# Patient Record
Sex: FEMALE | Race: WHITE | NOT HISPANIC OR LATINO | Employment: OTHER | URBAN - METROPOLITAN AREA
[De-identification: names, ages, dates, MRNs, and addresses within clinical notes are randomized per-mention and may not be internally consistent; named-entity substitution may affect disease eponyms.]

---

## 2023-07-17 ENCOUNTER — APPOINTMENT (OUTPATIENT)
Dept: LAB | Facility: LAB | Age: 73
End: 2023-07-17
Payer: MEDICARE

## 2023-07-20 LAB
COTININE BLOOD QUANTITATIVE: 9 NG/ML
NICOTINE BLOOD QUANTITATIVE: <5 NG/ML

## 2023-09-13 PROBLEM — J93.9 PNEUMOTHORAX ON LEFT: Status: ACTIVE | Noted: 2023-09-13

## 2023-09-13 PROBLEM — G62.9 PERIPHERAL NEUROPATHY: Status: ACTIVE | Noted: 2023-09-13

## 2023-09-13 PROBLEM — Z96.89 S/P INSERTION OF SPINAL CORD STIMULATOR: Status: ACTIVE | Noted: 2023-09-13

## 2023-09-13 PROBLEM — G89.4 CHRONIC PAIN DISORDER: Status: ACTIVE | Noted: 2023-09-13

## 2023-09-13 PROBLEM — R60.0 EDEMA OF LOWER EXTREMITY: Status: ACTIVE | Noted: 2023-09-13

## 2023-09-13 PROBLEM — N81.10 FEMALE CYSTOCELE: Status: ACTIVE | Noted: 2023-09-13

## 2023-09-13 PROBLEM — M96.842 POSTPROCEDURAL SEROMA OF A MUSCULOSKELETAL STRUCTURE FOLLOWING A MUSCULOSKELETAL SYSTEM PROCEDURE: Status: ACTIVE | Noted: 2023-09-13

## 2023-09-13 PROBLEM — M48.061 LUMBAR SPINAL STENOSIS: Status: ACTIVE | Noted: 2023-09-13

## 2023-09-13 PROBLEM — G44.86 CERVICOGENIC HEADACHE: Status: ACTIVE | Noted: 2023-09-13

## 2023-09-13 PROBLEM — E11.9 TYPE 2 DIABETES MELLITUS (MULTI): Status: ACTIVE | Noted: 2018-01-26

## 2023-09-13 PROBLEM — N39.0 ACUTE UTI: Status: ACTIVE | Noted: 2023-09-13

## 2023-09-13 PROBLEM — I67.1 INTRACRANIAL ANEURYSM (HHS-HCC): Status: ACTIVE | Noted: 2023-09-13

## 2023-09-13 PROBLEM — T85.192A SPINAL CORD STIMULATOR DYSFUNCTION (CMS-HCC): Status: ACTIVE | Noted: 2023-09-13

## 2023-09-13 PROBLEM — M32.9 LUPUS (MULTI): Status: ACTIVE | Noted: 2023-09-13

## 2023-09-13 PROBLEM — D68.2 FACTOR V DEFICIENCY (MULTI): Status: ACTIVE | Noted: 2023-09-13

## 2023-09-13 PROBLEM — M25.561 BILATERAL KNEE PAIN: Status: ACTIVE | Noted: 2023-09-13

## 2023-09-13 PROBLEM — M25.562 BILATERAL KNEE PAIN: Status: ACTIVE | Noted: 2023-09-13

## 2023-09-13 PROBLEM — Z98.1 S/P LUMBAR FUSION: Status: ACTIVE | Noted: 2023-09-13

## 2023-09-13 PROBLEM — D68.51 FACTOR 5 LEIDEN MUTATION, HETEROZYGOUS (MULTI): Status: ACTIVE | Noted: 2023-07-05

## 2023-09-13 PROBLEM — T45.4X5A ADVERSE EFFECT OF COMPOUND IRON PREPARATION: Status: ACTIVE | Noted: 2023-09-13

## 2023-09-13 PROBLEM — M54.17 LUMBOSACRAL RADICULITIS: Status: ACTIVE | Noted: 2023-09-13

## 2023-09-13 PROBLEM — N39.3 SI (STRESS INCONTINENCE), FEMALE: Status: ACTIVE | Noted: 2023-09-13

## 2023-09-13 PROBLEM — S91.301A WOUND OF RIGHT FOOT: Status: ACTIVE | Noted: 2023-09-13

## 2023-09-13 PROBLEM — T81.89XA INCISIONAL IRRITATION: Status: ACTIVE | Noted: 2023-09-13

## 2023-09-13 PROBLEM — D50.9 IDA (IRON DEFICIENCY ANEMIA): Status: ACTIVE | Noted: 2023-07-05

## 2023-09-13 PROBLEM — N81.2 UTEROVAGINAL PROLAPSE, INCOMPLETE: Status: ACTIVE | Noted: 2023-09-13

## 2023-09-13 PROBLEM — J18.9 PNEUMONIA: Status: ACTIVE | Noted: 2023-09-13

## 2023-09-13 PROBLEM — H26.9 CATARACT: Status: ACTIVE | Noted: 2023-09-13

## 2023-09-13 PROBLEM — E66.01 MORBID OBESITY DUE TO EXCESS CALORIES (MULTI): Status: ACTIVE | Noted: 2023-09-13

## 2023-09-13 PROBLEM — I10 HTN (HYPERTENSION): Status: ACTIVE | Noted: 2023-09-13

## 2023-09-13 PROBLEM — M19.90 ARTHRITIS: Status: ACTIVE | Noted: 2023-09-13

## 2023-09-13 PROBLEM — F17.200 TOBACCO DEPENDENCY: Status: ACTIVE | Noted: 2023-09-13

## 2023-09-13 PROBLEM — E78.5 ELEVATED LIPIDS: Status: ACTIVE | Noted: 2023-09-13

## 2023-09-13 PROBLEM — G47.30 SLEEP APNEA: Status: ACTIVE | Noted: 2023-09-13

## 2023-09-13 PROBLEM — G57.00 PYRIFORMIS SYNDROME: Status: ACTIVE | Noted: 2023-09-13

## 2023-09-13 PROBLEM — K58.9 IBS (IRRITABLE BOWEL SYNDROME): Status: ACTIVE | Noted: 2023-09-13

## 2023-09-13 PROBLEM — K21.00 REFLUX ESOPHAGITIS: Status: ACTIVE | Noted: 2018-03-28

## 2023-09-13 PROBLEM — E11.9 DIABETES (MULTI): Status: ACTIVE | Noted: 2023-09-13

## 2023-09-13 PROBLEM — D64.9 ANEMIA: Status: ACTIVE | Noted: 2023-09-13

## 2023-09-13 PROBLEM — K44.9 HIATAL HERNIA: Status: ACTIVE | Noted: 2018-03-28

## 2023-09-13 PROBLEM — K25.9 GASTRIC ULCER: Status: ACTIVE | Noted: 2018-03-28

## 2023-09-13 PROBLEM — N81.6 HERNIATION OF RECTUM INTO VAGINA: Status: ACTIVE | Noted: 2023-09-13

## 2023-09-13 RX ORDER — GABAPENTIN 600 MG/1
TABLET ORAL
COMMUNITY
Start: 2021-10-08

## 2023-09-13 RX ORDER — HYDROCORTISONE 25 MG/G
CREAM TOPICAL
COMMUNITY
Start: 2023-02-21 | End: 2023-10-30 | Stop reason: ALTCHOICE

## 2023-09-13 RX ORDER — OXYCODONE AND ACETAMINOPHEN 5; 325 MG/1; MG/1
TABLET ORAL
COMMUNITY
Start: 2010-04-14 | End: 2023-10-30 | Stop reason: ALTCHOICE

## 2023-09-13 RX ORDER — GUAIFENESIN 1200 MG
650 TABLET, EXTENDED RELEASE 12 HR ORAL
COMMUNITY
Start: 2022-04-29

## 2023-09-13 RX ORDER — CHOLECALCIFEROL (VITAMIN D3) 25 MCG
TABLET ORAL
COMMUNITY
End: 2023-10-30 | Stop reason: ALTCHOICE

## 2023-09-13 RX ORDER — ASPIRIN 325 MG
325 TABLET ORAL 2 TIMES DAILY
COMMUNITY
Start: 2010-04-14 | End: 2023-10-30 | Stop reason: ALTCHOICE

## 2023-09-13 RX ORDER — BENZONATATE 100 MG/1
200 CAPSULE ORAL 3 TIMES DAILY
COMMUNITY
Start: 2022-10-16 | End: 2023-10-30 | Stop reason: ALTCHOICE

## 2023-09-13 RX ORDER — OMEPRAZOLE 40 MG/1
CAPSULE, DELAYED RELEASE ORAL
COMMUNITY
Start: 2021-04-19

## 2023-09-13 RX ORDER — TRIAMTERENE/HYDROCHLOROTHIAZID 37.5-25 MG
TABLET ORAL
COMMUNITY
Start: 2011-08-31

## 2023-09-13 RX ORDER — TORSEMIDE 10 MG/1
TABLET ORAL
COMMUNITY
Start: 2021-07-13 | End: 2024-01-08

## 2023-09-13 RX ORDER — ALBUTEROL SULFATE 90 UG/1
2 AEROSOL, METERED RESPIRATORY (INHALATION) EVERY 6 HOURS PRN
COMMUNITY
Start: 2022-11-11

## 2023-09-13 RX ORDER — PREDNISONE 10 MG/1
TABLET ORAL
COMMUNITY
Start: 2022-10-16 | End: 2023-10-30 | Stop reason: ALTCHOICE

## 2023-09-13 RX ORDER — OXYCODONE HYDROCHLORIDE 5 MG/1
TABLET ORAL
COMMUNITY
Start: 2022-01-16

## 2023-09-13 RX ORDER — COVID-19 MOLECULAR TEST ASSAY
KIT MISCELLANEOUS
COMMUNITY
Start: 2023-03-05 | End: 2023-10-30 | Stop reason: ALTCHOICE

## 2023-09-13 RX ORDER — METFORMIN HYDROCHLORIDE 500 MG/1
TABLET ORAL
COMMUNITY
Start: 2010-04-14 | End: 2023-10-30 | Stop reason: ALTCHOICE

## 2023-09-13 RX ORDER — CLOTRIMAZOLE 1 %
CREAM (GRAM) TOPICAL
COMMUNITY
Start: 2023-06-01 | End: 2023-10-30 | Stop reason: ALTCHOICE

## 2023-09-13 RX ORDER — ERGOCALCIFEROL (VITAMIN D2) 50 MCG
CAPSULE ORAL
COMMUNITY
End: 2023-10-30 | Stop reason: SDUPTHER

## 2023-09-13 RX ORDER — HYDROXYCHLOROQUINE SULFATE 200 MG/1
TABLET, FILM COATED ORAL
COMMUNITY
End: 2023-10-30 | Stop reason: ALTCHOICE

## 2023-09-13 RX ORDER — FUROSEMIDE 20 MG/1
20 TABLET ORAL
COMMUNITY
Start: 2022-10-14

## 2023-09-13 RX ORDER — DULOXETIN HYDROCHLORIDE 30 MG/1
30 CAPSULE, DELAYED RELEASE ORAL DAILY
COMMUNITY
End: 2023-11-24

## 2023-09-13 RX ORDER — LEVOTHYROXINE SODIUM 125 UG/1
TABLET ORAL
COMMUNITY
Start: 2010-04-14

## 2023-09-13 RX ORDER — MUPIROCIN 20 MG/G
OINTMENT TOPICAL
COMMUNITY
Start: 2023-03-24 | End: 2023-10-30 | Stop reason: ALTCHOICE

## 2023-09-13 RX ORDER — GLIMEPIRIDE 2 MG/1
2 TABLET ORAL
COMMUNITY
Start: 2023-06-01 | End: 2024-01-08

## 2023-09-13 RX ORDER — MELOXICAM 15 MG/1
15 TABLET ORAL
COMMUNITY
Start: 2010-04-14 | End: 2023-10-30 | Stop reason: ALTCHOICE

## 2023-09-13 RX ORDER — AMLODIPINE BESYLATE 5 MG/1
5 TABLET ORAL DAILY
COMMUNITY

## 2023-09-13 RX ORDER — METFORMIN HYDROCHLORIDE 1000 MG/1
TABLET ORAL
COMMUNITY
Start: 2021-10-08

## 2023-09-13 RX ORDER — OXYCODONE AND ACETAMINOPHEN 10; 325 MG/1; MG/1
TABLET ORAL
COMMUNITY

## 2023-09-13 RX ORDER — PNV NO.95/FERROUS FUM/FOLIC AC 28MG-0.8MG
TABLET ORAL
COMMUNITY
End: 2023-10-30 | Stop reason: ALTCHOICE

## 2023-09-13 RX ORDER — TOPIRAMATE 25 MG/1
TABLET ORAL
COMMUNITY
End: 2023-10-30 | Stop reason: SINTOL

## 2023-09-13 RX ORDER — NIRMATRELVIR AND RITONAVIR 300-100 MG
KIT ORAL
COMMUNITY
Start: 2022-10-04 | End: 2023-10-30 | Stop reason: ALTCHOICE

## 2023-10-04 DIAGNOSIS — Z98.1 S/P LUMBAR FUSION: ICD-10-CM

## 2023-10-16 ENCOUNTER — OFFICE VISIT (OUTPATIENT)
Dept: NEUROSURGERY | Facility: CLINIC | Age: 73
End: 2023-10-16
Payer: MEDICARE

## 2023-10-16 VITALS
BODY MASS INDEX: 42.48 KG/M2 | HEART RATE: 93 BPM | TEMPERATURE: 97.3 F | SYSTOLIC BLOOD PRESSURE: 168 MMHG | HEIGHT: 61 IN | DIASTOLIC BLOOD PRESSURE: 76 MMHG | WEIGHT: 225 LBS

## 2023-10-16 DIAGNOSIS — G89.29 CHRONIC LOW BACK PAIN WITH LEFT-SIDED SCIATICA, UNSPECIFIED BACK PAIN LATERALITY: Primary | ICD-10-CM

## 2023-10-16 DIAGNOSIS — M54.42 CHRONIC LOW BACK PAIN WITH LEFT-SIDED SCIATICA, UNSPECIFIED BACK PAIN LATERALITY: Primary | ICD-10-CM

## 2023-10-16 PROCEDURE — 3077F SYST BP >= 140 MM HG: CPT | Performed by: STUDENT IN AN ORGANIZED HEALTH CARE EDUCATION/TRAINING PROGRAM

## 2023-10-16 PROCEDURE — 3078F DIAST BP <80 MM HG: CPT | Performed by: STUDENT IN AN ORGANIZED HEALTH CARE EDUCATION/TRAINING PROGRAM

## 2023-10-16 PROCEDURE — 1125F AMNT PAIN NOTED PAIN PRSNT: CPT | Performed by: STUDENT IN AN ORGANIZED HEALTH CARE EDUCATION/TRAINING PROGRAM

## 2023-10-16 PROCEDURE — 99024 POSTOP FOLLOW-UP VISIT: CPT | Performed by: STUDENT IN AN ORGANIZED HEALTH CARE EDUCATION/TRAINING PROGRAM

## 2023-10-16 PROCEDURE — 1159F MED LIST DOCD IN RCRD: CPT | Performed by: STUDENT IN AN ORGANIZED HEALTH CARE EDUCATION/TRAINING PROGRAM

## 2023-10-16 PROCEDURE — 1160F RVW MEDS BY RX/DR IN RCRD: CPT | Performed by: STUDENT IN AN ORGANIZED HEALTH CARE EDUCATION/TRAINING PROGRAM

## 2023-10-16 ASSESSMENT — PAIN SCALES - GENERAL: PAINLEVEL: 7

## 2023-10-16 ASSESSMENT — PATIENT HEALTH QUESTIONNAIRE - PHQ9
2. FEELING DOWN, DEPRESSED OR HOPELESS: NOT AT ALL
SUM OF ALL RESPONSES TO PHQ9 QUESTIONS 1 AND 2: 0
1. LITTLE INTEREST OR PLEASURE IN DOING THINGS: NOT AT ALL

## 2023-10-16 ASSESSMENT — ENCOUNTER SYMPTOMS: DEPRESSION: 0

## 2023-10-16 NOTE — PROGRESS NOTES
[facility] [place]  Department of Neurological Surgery  Post Operative Patient Visit      History of Present Illness:  Kate Howard is a 73 y.o. year old female who presents to the spine clinic in a post operative visit. Since surgery they are doing okay. Today, she is complaining of constant mid and lower back pain radiating to the right. The pain is aching and worsens with applied pressure. She reports that her pain medication requirements are better now than before surgery. She has been doing physical therapy which she enjoys.    Her X-ray from 10/12/2023 looks good, her hardware is in good position. Continue with physical and aquatic therapy. Follow up in 4 months for routine, call sooner if needed.    The above clinical summary has been dictated with voice recognition software. It has not been proofread for grammatical errors, typographical mistakes, or other semantic inconsistencies.    Thank you for visiting our office today. It was our pleasure to take part in your healthcare.     Do not hesitate to call with any questions regarding your plan of care after leaving at [phone] M-F 8am-4pm.     To clinicians, thank you very much for this kind referral. It is a privilege to partner with you in the care of your patients. My office would be delighted to assist you with any further consultations or with questions regarding the plan of care outlined. Do not hesitate to call the office or contact me directly.       Sincerely,      Doug Whitehead MD  Director, [facility]   of Neurosurgery, [facility] and [facility]  Complex Spine Fellowship Director  , Department of Neurological Surgery  [facility]    [address]  [facility]  [address]  [place] [address]    Phone: [phone]  Fax: [phone]        Scribe Attestation  By signing my name below, I, Siri Corral , Scribe   attest that this documentation has been prepared under the direction and in the presence of Doug Whitehead MD.

## 2023-10-19 ENCOUNTER — TELEPHONE (OUTPATIENT)
Dept: NEUROSURGERY | Facility: CLINIC | Age: 73
End: 2023-10-19
Payer: MEDICARE

## 2023-10-30 ENCOUNTER — OFFICE VISIT (OUTPATIENT)
Dept: NEUROSURGERY | Facility: CLINIC | Age: 73
End: 2023-10-30
Payer: MEDICARE

## 2023-10-30 VITALS
BODY MASS INDEX: 41.54 KG/M2 | SYSTOLIC BLOOD PRESSURE: 132 MMHG | WEIGHT: 220 LBS | DIASTOLIC BLOOD PRESSURE: 84 MMHG | HEIGHT: 61 IN | TEMPERATURE: 96.9 F | HEART RATE: 89 BPM

## 2023-10-30 DIAGNOSIS — M48.062 SPINAL STENOSIS OF LUMBAR REGION WITH NEUROGENIC CLAUDICATION: Primary | ICD-10-CM

## 2023-10-30 PROCEDURE — 1125F AMNT PAIN NOTED PAIN PRSNT: CPT | Performed by: STUDENT IN AN ORGANIZED HEALTH CARE EDUCATION/TRAINING PROGRAM

## 2023-10-30 PROCEDURE — 3075F SYST BP GE 130 - 139MM HG: CPT | Performed by: STUDENT IN AN ORGANIZED HEALTH CARE EDUCATION/TRAINING PROGRAM

## 2023-10-30 PROCEDURE — 1160F RVW MEDS BY RX/DR IN RCRD: CPT | Performed by: STUDENT IN AN ORGANIZED HEALTH CARE EDUCATION/TRAINING PROGRAM

## 2023-10-30 PROCEDURE — 3079F DIAST BP 80-89 MM HG: CPT | Performed by: STUDENT IN AN ORGANIZED HEALTH CARE EDUCATION/TRAINING PROGRAM

## 2023-10-30 PROCEDURE — 99024 POSTOP FOLLOW-UP VISIT: CPT | Performed by: STUDENT IN AN ORGANIZED HEALTH CARE EDUCATION/TRAINING PROGRAM

## 2023-10-30 PROCEDURE — 1159F MED LIST DOCD IN RCRD: CPT | Performed by: STUDENT IN AN ORGANIZED HEALTH CARE EDUCATION/TRAINING PROGRAM

## 2023-10-30 ASSESSMENT — PATIENT HEALTH QUESTIONNAIRE - PHQ9
SUM OF ALL RESPONSES TO PHQ9 QUESTIONS 1 & 2: 0
1. LITTLE INTEREST OR PLEASURE IN DOING THINGS: NOT AT ALL
2. FEELING DOWN, DEPRESSED OR HOPELESS: NOT AT ALL

## 2023-10-30 ASSESSMENT — PAIN SCALES - GENERAL: PAINLEVEL: 7

## 2023-10-30 ASSESSMENT — LIFESTYLE VARIABLES
AUDIT-C TOTAL SCORE: 0
HOW OFTEN DO YOU HAVE A DRINK CONTAINING ALCOHOL: NEVER
HOW MANY STANDARD DRINKS CONTAINING ALCOHOL DO YOU HAVE ON A TYPICAL DAY: PATIENT DOES NOT DRINK
SKIP TO QUESTIONS 9-10: 1
HOW OFTEN DO YOU HAVE SIX OR MORE DRINKS ON ONE OCCASION: NEVER

## 2023-10-30 NOTE — PROGRESS NOTES
[facility] [place]  Department of Neurological Surgery  Post Operative Patient Visit      History of Present Illness:  Kate Howard is a 73 y.o. year old female who presents to the spine clinic in a post operative visit. Since surgery they are 3mo post op s/p L1-pelvis extension revision of hardware. She is doing well. She had a seroma at the site of surgery so she's here in office today for drainage. Verbal consent was obtained and needle aspiration of the seroma was performed at the bedside. She tolerated the procedure well. 100 cc of fluid was aspirated.    The above clinical summary has been dictated with voice recognition software. It has not been proofread for grammatical errors, typographical mistakes, or other semantic inconsistencies.    Thank you for visiting our office today. It was our pleasure to take part in your healthcare.     Do not hesitate to call with any questions regarding your plan of care after leaving at [phone] M-F 8am-4pm.     To clinicians, thank you very much for this kind referral. It is a privilege to partner with you in the care of your patients. My office would be delighted to assist you with any further consultations or with questions regarding the plan of care outlined. Do not hesitate to call the office or contact me directly.       Sincerely,      Doug Whitehead MD  Director, [facility] [place]   of Neurosurgery, [facility] and [facility]  Complex Spine Fellowship Director  , Department of Neurological Surgery  [facility]    [place] [address] [address] [place] [address] [id number] [place] [address]  [address] [place] [address]  [address] [place] [address]    Phone: [phone]  Fax: [phone]        Scribe Attestation  By signing my name below, YEFRI Siri  Patrice Corral   attest that this documentation has been prepared under the direction and in the presence of Doug Whitehead MD.

## 2023-11-03 ENCOUNTER — TELEPHONE (OUTPATIENT)
Dept: NEUROSURGERY | Facility: CLINIC | Age: 73
End: 2023-11-03
Payer: MEDICARE

## 2023-11-13 ENCOUNTER — TELEPHONE (OUTPATIENT)
Dept: NEUROSURGERY | Facility: CLINIC | Age: 73
End: 2023-11-13
Payer: MEDICARE

## 2023-11-13 DIAGNOSIS — Z98.1 S/P LUMBAR FUSION: Primary | ICD-10-CM

## 2023-11-14 DIAGNOSIS — M48.062 NEUROGENIC CLAUDICATION DUE TO LUMBAR SPINAL STENOSIS: Primary | ICD-10-CM

## 2023-11-14 DIAGNOSIS — R79.1 ABNORMAL COAGULATION PROFILE: ICD-10-CM

## 2023-11-14 NOTE — PROGRESS NOTES
Increased fluid reaccumulation after drainage in office 1-2 weeks ago, concern for seroma reformation. Will send for serum blood counts to rule out concern for infection and MRI for re-eval.       Doug Whitehead M.D.  Director of Spine [place]  [facility]   of Neurological Surgery  [facility]  Office: [phone]  Fax: [phone]

## 2023-11-21 DIAGNOSIS — M96.1 POSTLAMINECTOMY SYNDROME, NOT ELSEWHERE CLASSIFIED: ICD-10-CM

## 2023-11-24 RX ORDER — DULOXETIN HYDROCHLORIDE 30 MG/1
30 CAPSULE, DELAYED RELEASE ORAL DAILY
Qty: 90 CAPSULE | Refills: 2 | Status: SHIPPED | OUTPATIENT
Start: 2023-11-24 | End: 2024-01-08

## 2023-12-08 ENCOUNTER — TELEPHONE (OUTPATIENT)
Dept: NEUROSURGERY | Facility: CLINIC | Age: 73
End: 2023-12-08
Payer: MEDICARE

## 2023-12-12 DIAGNOSIS — M48.062 SPINAL STENOSIS OF LUMBAR REGION WITH NEUROGENIC CLAUDICATION: Primary | ICD-10-CM

## 2024-01-03 ENCOUNTER — TELEPHONE (OUTPATIENT)
Dept: NEUROSURGERY | Facility: CLINIC | Age: 74
End: 2024-01-03
Payer: MEDICARE

## 2024-01-04 NOTE — TELEPHONE ENCOUNTER
How much is it putting out? She needs a follow up visit with Lydia or Kp to evaluate the drainage daily. It should be taken out in the office by us.

## 2024-01-09 ENCOUNTER — HOSPITAL ENCOUNTER (OUTPATIENT)
Dept: RADIOLOGY | Facility: HOSPITAL | Age: 74
Discharge: HOME | End: 2024-01-09
Payer: MEDICARE

## 2024-01-09 VITALS
SYSTOLIC BLOOD PRESSURE: 138 MMHG | RESPIRATION RATE: 14 BRPM | HEART RATE: 79 BPM | DIASTOLIC BLOOD PRESSURE: 77 MMHG | TEMPERATURE: 98.2 F | OXYGEN SATURATION: 96 %

## 2024-01-09 DIAGNOSIS — M48.062 SPINAL STENOSIS OF LUMBAR REGION WITH NEUROGENIC CLAUDICATION: ICD-10-CM

## 2024-01-09 LAB
INR PPP: 1 (ref 0.9–1.1)
PLATELET # BLD AUTO: 208 X10*3/UL (ref 150–450)
PROTHROMBIN TIME: 11.5 SECONDS (ref 9.8–12.8)

## 2024-01-09 PROCEDURE — 76942 ECHO GUIDE FOR BIOPSY: CPT | Performed by: RADIOLOGY

## 2024-01-09 PROCEDURE — 36415 COLL VENOUS BLD VENIPUNCTURE: CPT | Performed by: RADIOLOGY

## 2024-01-09 PROCEDURE — C1729 CATH, DRAINAGE: HCPCS

## 2024-01-09 PROCEDURE — 99152 MOD SED SAME PHYS/QHP 5/>YRS: CPT | Performed by: RADIOLOGY

## 2024-01-09 PROCEDURE — 85049 AUTOMATED PLATELET COUNT: CPT | Performed by: RADIOLOGY

## 2024-01-09 PROCEDURE — 49406 IMAGE CATH FLUID PERI/RETRO: CPT | Performed by: RADIOLOGY

## 2024-01-09 PROCEDURE — 10030 IMG GID FLU COLL DRG SFT TIS: CPT

## 2024-01-09 PROCEDURE — 76942 ECHO GUIDE FOR BIOPSY: CPT

## 2024-01-09 PROCEDURE — 85610 PROTHROMBIN TIME: CPT | Performed by: RADIOLOGY

## 2024-01-09 PROCEDURE — 2720000007 HC OR 272 NO HCPCS

## 2024-01-09 PROCEDURE — 2500000004 HC RX 250 GENERAL PHARMACY W/ HCPCS (ALT 636 FOR OP/ED): Performed by: RADIOLOGY

## 2024-01-09 PROCEDURE — 2500000005 HC RX 250 GENERAL PHARMACY W/O HCPCS: Performed by: RADIOLOGY

## 2024-01-09 PROCEDURE — C1769 GUIDE WIRE: HCPCS

## 2024-01-09 PROCEDURE — 99152 MOD SED SAME PHYS/QHP 5/>YRS: CPT

## 2024-01-09 RX ORDER — MIDAZOLAM HYDROCHLORIDE 1 MG/ML
INJECTION INTRAMUSCULAR; INTRAVENOUS
Status: COMPLETED | OUTPATIENT
Start: 2024-01-09 | End: 2024-01-09

## 2024-01-09 RX ORDER — SODIUM CHLORIDE 9 MG/ML
INJECTION, SOLUTION INTRAVENOUS CONTINUOUS PRN
Status: COMPLETED | OUTPATIENT
Start: 2024-01-09 | End: 2024-01-09

## 2024-01-09 RX ORDER — FENTANYL CITRATE 50 UG/ML
INJECTION, SOLUTION INTRAMUSCULAR; INTRAVENOUS
Status: COMPLETED | OUTPATIENT
Start: 2024-01-09 | End: 2024-01-09

## 2024-01-09 RX ADMIN — FENTANYL CITRATE 50 MCG: 50 INJECTION, SOLUTION INTRAMUSCULAR; INTRAVENOUS at 10:50

## 2024-01-09 RX ADMIN — SODIUM CHLORIDE 50 ML/HR: 9 INJECTION, SOLUTION INTRAVENOUS at 10:50

## 2024-01-09 RX ADMIN — Medication 3 L/MIN: at 10:50

## 2024-01-09 RX ADMIN — MIDAZOLAM HYDROCHLORIDE 1 MG: 1 INJECTION, SOLUTION INTRAMUSCULAR; INTRAVENOUS at 10:50

## 2024-01-09 ASSESSMENT — PAIN - FUNCTIONAL ASSESSMENT
PAIN_FUNCTIONAL_ASSESSMENT: 0-10

## 2024-01-09 ASSESSMENT — PAIN SCALES - GENERAL
PAINLEVEL_OUTOF10: 0 - NO PAIN
PAINLEVEL_OUTOF10: 5 - MODERATE PAIN
PAINLEVEL_OUTOF10: 0 - NO PAIN
PAINLEVEL_OUTOF10: 5 - MODERATE PAIN
PAINLEVEL_OUTOF10: 5 - MODERATE PAIN
PAINLEVEL_OUTOF10: 0 - NO PAIN

## 2024-01-09 ASSESSMENT — PAIN DESCRIPTION - DESCRIPTORS
DESCRIPTORS: ACHING

## 2024-01-09 NOTE — Clinical Note
StayFix dressing and tegaderm placed by Dr. Granados. [place] drain bag attached to drain. Procedure end.

## 2024-01-09 NOTE — PROCEDURES
Interventional Radiology Brief Postprocedure Note    Attending: Anneliese Granados MD    Assistant:   Staff Role   Michelle Carmen MT Radiology Technologist   Caroline Cadena, RN Radiology Nurse   Anneliese Granados MD Radiologist       Diagnosis:   1. Spinal stenosis of lumbar region with neurogenic claudication  IR body drain placement    IR body drain placement          Description of procedure: IR body drain placement 10 Fr drain lower back seroma    Timeout:  Yes    Procedure Area: Procedure Area     Anesthesia:   Conscious Sedation    Complications: None    Estimated Blood Loss: none    Medications (Filter: Administrations occurring from 1037 to 1107 on 01/09/24) As of 01/09/24 1107      fentaNYL PF (Sublimaze) injection (mcg) Total dose:  50 mcg      Date/Time Rate/Dose/Volume Action       01/09/24  1050 50 mcg Given               midazolam (Versed) injection (mg) Total dose:  1 mg      Date/Time Rate/Dose/Volume Action       01/09/24  1050 1 mg Given               sodium chloride 0.9% infusion (mL/hr) Total volume:  Not documented*   *Total volume has not been documented. View each administration to see the amount administered.     Date/Time Rate/Dose/Volume Action       01/09/24  1050 50 mL/hr New Bag               oxygen (O2) therapy (L/min) Total volume:  Not documented*   *Total volume has not been documented. View each administration to see the amount administered.     Date/Time Rate/Dose/Volume Action       01/09/24  1050 3 L/min Start                   No specimens collected      See detailed result report with images in PACS.    The patient tolerated the procedure well without incident or complication and is in stable condition.

## 2024-01-09 NOTE — PRE-PROCEDURE NOTE
Interventional Radiology Preprocedure Note    Indication for procedure: The encounter diagnosis was Spinal stenosis of lumbar region with neurogenic claudication.    Relevant review of systems: NA    Relevant Labs:   Lab Results   Component Value Date    CREATININE CANCELED 08/05/2023    INR 1.0 01/09/2024    PROTIME 11.5 01/09/2024       Planned Sedation/Anesthesia: Moderate    Airway assessment: normal    Directed physical examination:    Aox3  No increased work of breathing.   No acute distress      Mallampati: II (hard and soft palate, upper portion of tonsils anduvula visible)    ASA Score: ASA 2 - Patient with mild systemic disease with no functional limitations    Benefits, risks and alternatives of procedure and planned sedation have been discussed with the patient and/or their representative. All questions answered and they agree to proceed.

## 2024-01-09 NOTE — DISCHARGE INSTRUCTIONS
Patient educated regarding care after drain placement for seroma as well as given written instruction, including do not get dressing wet, flush once daily (5 ml toward patient, 5 ml toward drainage bag), and how to change dressing. Patient also given 3 weeks worth of supplies for dressings and flushes.

## 2024-01-18 ENCOUNTER — OFFICE VISIT (OUTPATIENT)
Dept: NEUROSURGERY | Facility: CLINIC | Age: 74
End: 2024-01-18
Payer: MEDICARE

## 2024-01-18 DIAGNOSIS — M96.842 POSTOPERATIVE SEROMA OF MUSCULOSKELETAL STRUCTURE AFTER MUSCULOSKELETAL PROCEDURE: Primary | ICD-10-CM

## 2024-01-18 DIAGNOSIS — Z98.1 S/P LUMBAR FUSION: ICD-10-CM

## 2024-01-18 PROCEDURE — 3008F BODY MASS INDEX DOCD: CPT | Performed by: NURSE PRACTITIONER

## 2024-01-18 PROCEDURE — 1125F AMNT PAIN NOTED PAIN PRSNT: CPT | Performed by: NURSE PRACTITIONER

## 2024-01-18 PROCEDURE — 1160F RVW MEDS BY RX/DR IN RCRD: CPT | Performed by: NURSE PRACTITIONER

## 2024-01-18 PROCEDURE — 99213 OFFICE O/P EST LOW 20 MIN: CPT | Performed by: NURSE PRACTITIONER

## 2024-01-18 PROCEDURE — 1159F MED LIST DOCD IN RCRD: CPT | Performed by: NURSE PRACTITIONER

## 2024-01-18 ASSESSMENT — PATIENT HEALTH QUESTIONNAIRE - PHQ9
6. FEELING BAD ABOUT YOURSELF - OR THAT YOU ARE A FAILURE OR HAVE LET YOURSELF OR YOUR FAMILY DOWN: NOT AT ALL
4. FEELING TIRED OR HAVING LITTLE ENERGY: NEARLY EVERY DAY
1. LITTLE INTEREST OR PLEASURE IN DOING THINGS: MORE THAN HALF THE DAYS
5. POOR APPETITE OR OVEREATING: NOT AT ALL
2. FEELING DOWN, DEPRESSED OR HOPELESS: SEVERAL DAYS
8. MOVING OR SPEAKING SO SLOWLY THAT OTHER PEOPLE COULD HAVE NOTICED. OR THE OPPOSITE, BEING SO FIGETY OR RESTLESS THAT YOU HAVE BEEN MOVING AROUND A LOT MORE THAN USUAL: NOT AT ALL
SUM OF ALL RESPONSES TO PHQ9 QUESTIONS 1 & 2: 3
7. TROUBLE CONCENTRATING ON THINGS, SUCH AS READING THE NEWSPAPER OR WATCHING TELEVISION: NOT AT ALL

## 2024-01-18 ASSESSMENT — ENCOUNTER SYMPTOMS: OCCASIONAL FEELINGS OF UNSTEADINESS: 1

## 2024-01-18 NOTE — PROGRESS NOTES
"Kate Howard is here today in follow up for post operative seroma and to have drain removed. To review, she was last evaluated on Dr. Doug Whitehead on 10/30/2023. She then underwent placement of drain in lumbar seroma by Dr. Reji Granados at [facility] on 01/09/2024. 100 mL of serous fluid was drained at placement.    She underwent L1 - pelvis extension / revision of hardware on 08/02/2023, by Dr. Doug Whitehead at [facility].     Today, she reports that she has had 100 mL emptied daily for the first week, and 75 mL daily over past 2 days. She denies positional headaches (\"just my normal headaches\" present prior to surgery). She is able to complete ADLs with some assistance from others.     TREATMENTS:  Drain    SMOKER: YES  ANTICOAGULANT USE: No    ROS x 10 is, otherwise, negative unless documented in HPI above    On Exam: Appears comfortable  A&O x 4, speech clear / fluent  Respirations even / unlabored  Abdomen without distension  WALSH  Drain site at left lower lumbar region is clean /dry with suture intact. Drainage is clear, light lennox. Approximately 75 mL in drain at this time. Dressing changed at this visit, using her supplies.  Gait is steady with use of wheeled walker    Discussed with Dr. Whitehead: will maintain drain for another week, then remove at next visit (01/25/2024). We discussed plan for return in one week. She is in agreement with plan and agrees to continue documenting drain output.         "

## 2024-01-23 ENCOUNTER — TELEPHONE (OUTPATIENT)
Dept: NEUROSURGERY | Facility: CLINIC | Age: 74
End: 2024-01-23
Payer: MEDICARE

## 2024-01-24 NOTE — PROGRESS NOTES
Kate Howard is here, with her , today in follow up for post operative seroma and to have drain removed. To review, she was last evaluated on Dr. Doug Whitehead on 10/30/2023. She then underwent placement of drain in lumbar seroma by Dr. Reji Granados at [facility] on 01/09/2024. 100 mL of serous fluid was drained at placement.     She underwent L1 - pelvis extension / revision of hardware on 08/02/2023, by Dr. Doug Whitehead at [facility].     Today, she reports continuation of 80 - 90 mL drainage, serous colored, daily (amount includes 2 flushes). She continues to deny new headaches.     SMOKER: YES  ANTICOAGULANT USE: No     ROS x 10 is, otherwise, negative unless documented in HPI above     On Exam: Appears comfortable  A&O x 4, speech clear / fluent  Respirations even / unlabored  Abdomen without distension  WALSH - ambulates with wheeled walker  INCISION: well - healed.   DRAIN: serous / yellow clear drainage in drain collection bag. No swelling at drain / seroma site    Kate Howard is progressing well post drain placement. Discussed out put with Dr. Whitehead: will ask Dr. Reji Granados to consider sclerosis of seroma (secure chat sent). Discussed with patient: she will await phone call from our or Dr. Granados's office to discuss.    Otherwise, she agrees to follow up with Dr. Whitehead as previously scheduled, 02/05/2024,, with lumbar spine x-rays prior to visit.

## 2024-01-25 ENCOUNTER — OFFICE VISIT (OUTPATIENT)
Dept: NEUROSURGERY | Facility: CLINIC | Age: 74
End: 2024-01-25
Payer: MEDICARE

## 2024-01-25 ENCOUNTER — PREP FOR PROCEDURE (OUTPATIENT)
Dept: RADIOLOGY | Facility: HOSPITAL | Age: 74
End: 2024-01-25

## 2024-01-25 VITALS — TEMPERATURE: 96.6 F

## 2024-01-25 DIAGNOSIS — Z98.1 S/P LUMBAR FUSION: ICD-10-CM

## 2024-01-25 DIAGNOSIS — L76.34 POSTOPERATIVE SEROMA OF SUBCUTANEOUS TISSUE AFTER NON-DERMATOLOGIC PROCEDURE: Primary | ICD-10-CM

## 2024-01-25 DIAGNOSIS — M96.842 POSTOPERATIVE SEROMA OF MUSCULOSKELETAL STRUCTURE AFTER MUSCULOSKELETAL PROCEDURE: Primary | ICD-10-CM

## 2024-01-25 PROCEDURE — 1125F AMNT PAIN NOTED PAIN PRSNT: CPT | Performed by: NURSE PRACTITIONER

## 2024-01-25 PROCEDURE — 1160F RVW MEDS BY RX/DR IN RCRD: CPT | Performed by: NURSE PRACTITIONER

## 2024-01-25 PROCEDURE — 1157F ADVNC CARE PLAN IN RCRD: CPT | Performed by: NURSE PRACTITIONER

## 2024-01-25 PROCEDURE — 99213 OFFICE O/P EST LOW 20 MIN: CPT | Performed by: NURSE PRACTITIONER

## 2024-01-25 PROCEDURE — 3008F BODY MASS INDEX DOCD: CPT | Performed by: NURSE PRACTITIONER

## 2024-01-25 PROCEDURE — 1159F MED LIST DOCD IN RCRD: CPT | Performed by: NURSE PRACTITIONER

## 2024-01-25 ASSESSMENT — PAIN SCALES - GENERAL: PAINLEVEL: 7

## 2024-01-30 ENCOUNTER — TELEPHONE (OUTPATIENT)
Dept: NEUROSURGERY | Facility: CLINIC | Age: 74
End: 2024-01-30
Payer: MEDICARE

## 2024-02-02 ENCOUNTER — HOSPITAL ENCOUNTER (OUTPATIENT)
Dept: RADIOLOGY | Facility: CLINIC | Age: 74
Discharge: HOME | End: 2024-02-02
Payer: MEDICARE

## 2024-02-02 ENCOUNTER — HOSPITAL ENCOUNTER (OUTPATIENT)
Dept: RADIOLOGY | Facility: HOSPITAL | Age: 74
Discharge: HOME | End: 2024-02-02
Payer: MEDICARE

## 2024-02-02 VITALS
TEMPERATURE: 98.6 F | WEIGHT: 220.02 LBS | BODY MASS INDEX: 44.36 KG/M2 | DIASTOLIC BLOOD PRESSURE: 76 MMHG | SYSTOLIC BLOOD PRESSURE: 128 MMHG | RESPIRATION RATE: 16 BRPM | HEART RATE: 76 BPM | HEIGHT: 59 IN | OXYGEN SATURATION: 95 %

## 2024-02-02 DIAGNOSIS — M96.842 POSTOPERATIVE SEROMA OF MUSCULOSKELETAL STRUCTURE AFTER MUSCULOSKELETAL PROCEDURE: ICD-10-CM

## 2024-02-02 DIAGNOSIS — L76.34 POSTOPERATIVE SEROMA OF SUBCUTANEOUS TISSUE AFTER NON-DERMATOLOGIC PROCEDURE: ICD-10-CM

## 2024-02-02 DIAGNOSIS — Z98.1 S/P LUMBAR FUSION: ICD-10-CM

## 2024-02-02 PROCEDURE — 2500000004 HC RX 250 GENERAL PHARMACY W/ HCPCS (ALT 636 FOR OP/ED)

## 2024-02-02 PROCEDURE — 49185 SCLEROTX FLUID COLLECTION: CPT

## 2024-02-02 PROCEDURE — 76080 X-RAY EXAM OF FISTULA: CPT

## 2024-02-02 PROCEDURE — 2500000005 HC RX 250 GENERAL PHARMACY W/O HCPCS: Performed by: RADIOLOGY

## 2024-02-02 PROCEDURE — 99152 MOD SED SAME PHYS/QHP 5/>YRS: CPT

## 2024-02-02 PROCEDURE — 99152 MOD SED SAME PHYS/QHP 5/>YRS: CPT | Performed by: RADIOLOGY

## 2024-02-02 PROCEDURE — 2500000004 HC RX 250 GENERAL PHARMACY W/ HCPCS (ALT 636 FOR OP/ED): Performed by: RADIOLOGY

## 2024-02-02 PROCEDURE — 72100 X-RAY EXAM L-S SPINE 2/3 VWS: CPT | Performed by: RADIOLOGY

## 2024-02-02 PROCEDURE — 49185 SCLEROTX FLUID COLLECTION: CPT | Performed by: RADIOLOGY

## 2024-02-02 PROCEDURE — 49424 ASSESS CYST CONTRAST INJECT: CPT

## 2024-02-02 PROCEDURE — 72100 X-RAY EXAM L-S SPINE 2/3 VWS: CPT

## 2024-02-02 RX ORDER — SODIUM CHLORIDE 9 MG/ML
INJECTION, SOLUTION INTRAVENOUS CONTINUOUS PRN
Status: COMPLETED | OUTPATIENT
Start: 2024-02-02 | End: 2024-02-02

## 2024-02-02 RX ORDER — MIDAZOLAM HYDROCHLORIDE 1 MG/ML
INJECTION INTRAMUSCULAR; INTRAVENOUS
Status: COMPLETED | OUTPATIENT
Start: 2024-02-02 | End: 2024-02-02

## 2024-02-02 RX ORDER — FENTANYL CITRATE 50 UG/ML
INJECTION, SOLUTION INTRAMUSCULAR; INTRAVENOUS
Status: COMPLETED | OUTPATIENT
Start: 2024-02-02 | End: 2024-02-02

## 2024-02-02 RX ADMIN — Medication: at 13:30

## 2024-02-02 RX ADMIN — FENTANYL CITRATE 50 MCG: 50 INJECTION, SOLUTION INTRAMUSCULAR; INTRAVENOUS at 13:45

## 2024-02-02 RX ADMIN — MIDAZOLAM HYDROCHLORIDE 1 MG: 1 INJECTION, SOLUTION INTRAMUSCULAR; INTRAVENOUS at 13:45

## 2024-02-02 RX ADMIN — SODIUM CHLORIDE 50 ML/HR: 9 INJECTION, SOLUTION INTRAVENOUS at 13:30

## 2024-02-02 ASSESSMENT — PAIN SCALES - GENERAL
PAINLEVEL_OUTOF10: 0 - NO PAIN
PAINLEVEL_OUTOF10: 0 - NO PAIN
PAINLEVEL_OUTOF10: 3
PAINLEVEL_OUTOF10: 1
PAINLEVEL_OUTOF10: 0 - NO PAIN
PAINLEVEL_OUTOF10: 0 - NO PAIN
PAINLEVEL_OUTOF10: 1
PAINLEVEL_OUTOF10: 3
PAINLEVEL_OUTOF10: 0 - NO PAIN
PAINLEVEL_OUTOF10: 5 - MODERATE PAIN
PAINLEVEL_OUTOF10: 5 - MODERATE PAIN

## 2024-02-02 ASSESSMENT — PAIN - FUNCTIONAL ASSESSMENT
PAIN_FUNCTIONAL_ASSESSMENT: 0-10

## 2024-02-02 ASSESSMENT — PAIN DESCRIPTION - DESCRIPTORS
DESCRIPTORS: BURNING
DESCRIPTORS: BURNING
DESCRIPTORS: ACHING
DESCRIPTORS: ACHING

## 2024-02-02 NOTE — PRE-PROCEDURE NOTE
Interventional Radiology Preprocedure Note    Indication for procedure: The encounter diagnosis was Postoperative seroma of subcutaneous tissue after non-dermatologic procedure.    Relevant review of systems: NA    Relevant Labs:   Lab Results   Component Value Date    CREATININE CANCELED 08/05/2023    INR 1.0 01/09/2024    PROTIME 11.5 01/09/2024       Planned Sedation/Anesthesia: Moderate    Airway assessment: normal    Directed physical examination:    Aox3  No increased work of breathing.   No acute distress      Mallampati: II (hard and soft palate, upper portion of tonsils anduvula visible)    ASA Score: 2    Benefits, risks and alternatives of procedure and planned sedation have been discussed with the patient and/or their representative. All questions answered and they agree to proceed.

## 2024-02-02 NOTE — PROCEDURES
Interventional Radiology Brief Postprocedure Note    Attending: Anneliese Granados MD    Assistant:   Staff Role   No Staff Documented       Diagnosis:   1. Postoperative seroma of subcutaneous tissue after non-dermatologic procedure  FL guided sclerosis of fluid collection    FL guided sclerosis of fluid collection    CANCELED: IR embolization    CANCELED: IR embolization          Description of procedure: FL guided sclerosis of fluid collection 1 gm doxycyline powder reconsituted in 50 ml 50% ominiqpue 350    Timeout:  Yes    Procedure Area: Procedure Area     Anesthesia:   Conscious Sedation    Complications: None    Estimated Blood Loss: minimal    Medications (Filter: Administrations occurring from 1354 to 1405 on 02/02/24) As of 02/02/24 1405      None          No specimens collected      See detailed result report with images in PACS.    The patient tolerated the procedure well without incident or complication and is in stable condition.

## 2024-02-02 NOTE — DISCHARGE INSTRUCTIONS
Verbal and written instructions provided. Pt provided with new drainage bag and saline flushes per Dr Stout request.

## 2024-02-05 ENCOUNTER — APPOINTMENT (OUTPATIENT)
Dept: NEUROSURGERY | Facility: CLINIC | Age: 74
End: 2024-02-05
Payer: MEDICARE

## 2024-02-05 ENCOUNTER — OFFICE VISIT (OUTPATIENT)
Dept: NEUROSURGERY | Facility: CLINIC | Age: 74
End: 2024-02-05
Payer: MEDICARE

## 2024-02-05 VITALS
BODY MASS INDEX: 35.44 KG/M2 | HEART RATE: 84 BPM | SYSTOLIC BLOOD PRESSURE: 105 MMHG | DIASTOLIC BLOOD PRESSURE: 88 MMHG | WEIGHT: 200 LBS | HEIGHT: 63 IN | TEMPERATURE: 96.8 F

## 2024-02-05 DIAGNOSIS — M48.062 SPINAL STENOSIS OF LUMBAR REGION WITH NEUROGENIC CLAUDICATION: Primary | ICD-10-CM

## 2024-02-05 PROCEDURE — 1157F ADVNC CARE PLAN IN RCRD: CPT | Performed by: STUDENT IN AN ORGANIZED HEALTH CARE EDUCATION/TRAINING PROGRAM

## 2024-02-05 PROCEDURE — 1125F AMNT PAIN NOTED PAIN PRSNT: CPT | Performed by: STUDENT IN AN ORGANIZED HEALTH CARE EDUCATION/TRAINING PROGRAM

## 2024-02-05 PROCEDURE — 3008F BODY MASS INDEX DOCD: CPT | Performed by: STUDENT IN AN ORGANIZED HEALTH CARE EDUCATION/TRAINING PROGRAM

## 2024-02-05 PROCEDURE — 3079F DIAST BP 80-89 MM HG: CPT | Performed by: STUDENT IN AN ORGANIZED HEALTH CARE EDUCATION/TRAINING PROGRAM

## 2024-02-05 PROCEDURE — 1160F RVW MEDS BY RX/DR IN RCRD: CPT | Performed by: STUDENT IN AN ORGANIZED HEALTH CARE EDUCATION/TRAINING PROGRAM

## 2024-02-05 PROCEDURE — 3074F SYST BP LT 130 MM HG: CPT | Performed by: STUDENT IN AN ORGANIZED HEALTH CARE EDUCATION/TRAINING PROGRAM

## 2024-02-05 PROCEDURE — 99213 OFFICE O/P EST LOW 20 MIN: CPT | Performed by: STUDENT IN AN ORGANIZED HEALTH CARE EDUCATION/TRAINING PROGRAM

## 2024-02-05 PROCEDURE — 1159F MED LIST DOCD IN RCRD: CPT | Performed by: STUDENT IN AN ORGANIZED HEALTH CARE EDUCATION/TRAINING PROGRAM

## 2024-02-05 RX ORDER — LANOLIN ALCOHOL/MO/W.PET/CERES
1000 CREAM (GRAM) TOPICAL DAILY
COMMUNITY

## 2024-02-05 ASSESSMENT — PATIENT HEALTH QUESTIONNAIRE - PHQ9
SUM OF ALL RESPONSES TO PHQ9 QUESTIONS 1 & 2: 1
1. LITTLE INTEREST OR PLEASURE IN DOING THINGS: NOT AT ALL
2. FEELING DOWN, DEPRESSED OR HOPELESS: SEVERAL DAYS
10. IF YOU CHECKED OFF ANY PROBLEMS, HOW DIFFICULT HAVE THESE PROBLEMS MADE IT FOR YOU TO DO YOUR WORK, TAKE CARE OF THINGS AT HOME, OR GET ALONG WITH OTHER PEOPLE: SOMEWHAT DIFFICULT

## 2024-02-05 ASSESSMENT — LIFESTYLE VARIABLES: HOW OFTEN DO YOU HAVE A DRINK CONTAINING ALCOHOL: PATIENT DECLINED

## 2024-02-05 ASSESSMENT — PAIN SCALES - GENERAL: PAINLEVEL: 7

## 2024-02-05 NOTE — PROGRESS NOTES
[facility] [place]  Department of Neurological Surgery  Established Patient Visit    History of Present Illness:  Kate Howard is a 73 y.o. year old female who presents to the spine clinic in follow up with chronic spinal fluid leak. She is s/p L1 to pelvis extension/revision of hardware on 8/2/2023. Her post op course was complicated by a delayed seroma formed at her incision site. Her surgical drain was placed by an interventional radiologist on 1/9/2024. She has about 100 cc of output per day. She did sclerotherapy on 1/2/2024. She is back in office today to discuss drain management. At this point, she has no headaches, no nausea/vomiting, and is draining approximately 100-150 cc of spinal fluid per day.     Patient's BMI is Body mass index is 35.43 kg/m².    Review of Systems:  14/14 systems reviewed and negative other than what is listed in the history of present illness    Patient Active Problem List   Diagnosis    Postprocedural seroma of a musculoskeletal structure following a musculoskeletal system procedure    Pyriformis syndrome    Reflux esophagitis    S/P insertion of spinal cord stimulator    S/P lumbar fusion    Sacroiliitis (CMS/HCC)    SI (stress incontinence), female    Sleep apnea    Spinal cord stimulator dysfunction (CMS/HCC)    Tobacco dependency    Uterovaginal prolapse, incomplete    Vitamin B12 deficiency    Wound of right foot    Pneumothorax on left    Pneumonia    Peripheral neuropathy    Obesity    Morbid obesity due to excess calories (CMS/HCC)    Lupus (CMS/HCC)    Lumbosacral spondylosis    Lumbosacral radiculitis    Lumbar spinal stenosis    Intracranial aneurysm    Incisional irritation    ROXIE (iron deficiency anemia)    IBS (irritable bowel syndrome)    Hypothyroidism    HTN (hypertension)    Hiatal hernia    Gastric ulcer    Female cystocele    Factor V deficiency (CMS/HCC)    Factor 5 Leiden mutation, heterozygous (CMS/HCC)    Herniation of rectum into  vagina    Edema of lower extremity    Type 2 diabetes mellitus (CMS/HCC)    Diabetes (CMS/HCC)    Elevated lipids    Chronic pain disorder    Cervicogenic headache    Cataract    Bilateral knee pain    Arthritis    Anemia    Acute UTI    Adverse effect of compound iron preparation     Past Medical History:   Diagnosis Date    Personal history of other diseases of the musculoskeletal system and connective tissue     History of arthritis    Personal history of other endocrine, nutritional and metabolic disease     History of diabetes mellitus     Past Surgical History:   Procedure Laterality Date    IR BODY DRAIN PLACEMENT  1/9/2024    IR BODY DRAIN PLACEMENT 1/9/2024 Anneliese Granados MD PAR ANGIO    OTHER SURGICAL HISTORY  02/07/2020    Cholecystectomy    OTHER SURGICAL HISTORY  02/07/2020    Back surgery    OTHER SURGICAL HISTORY  02/07/2020    Hernia repair    US GUIDED ABSCESS FLUID COLLECTION DRAINAGE  7/27/2022    US GUIDED ABSCESS FLUID COLLECTION DRAINAGE 7/27/2022 [facility]     Social History     Tobacco Use    Smoking status: Every Day     Types: Cigarettes    Smokeless tobacco: Never   Substance Use Topics    Alcohol use: Never     family history includes Cancer in an other family member; Hypertension in an other family member; cardiac disorder in an other family member.    Current Outpatient Medications:     acetaminophen (TylenoL) 325 mg capsule, 2 capsules (650 mg)., Disp: , Rfl:     amLODIPine (Norvasc) 5 mg tablet, Take 1 tablet (5 mg) by mouth once daily., Disp: , Rfl:     cyanocobalamin (Vitamin B-12) 1,000 mcg tablet, Take 1 tablet (1,000 mcg) by mouth once daily., Disp: , Rfl:     furosemide (Lasix) 20 mg tablet, 1 tablet (20 mg)., Disp: , Rfl:     gabapentin (Neurontin) 600 mg tablet, , Disp: , Rfl:     levothyroxine (Synthroid, Levoxyl) 125 mcg tablet, Take by mouth., Disp: , Rfl:     metFORMIN (Glucophage) 1,000 mg tablet, Take by mouth., Disp: , Rfl:     omeprazole (PriLOSEC) 40 mg   capsule, , Disp: , Rfl:     oxyCODONE (Roxicodone) 5 mg immediate release tablet, , Disp: , Rfl:     oxyCODONE-acetaminophen (Percocet)  mg tablet, , Disp: , Rfl:     triamterene-hydrochlorothiazid (Maxzide-25) 37.5-25 mg tablet, Take by mouth., Disp: , Rfl:     albuterol 90 mcg/actuation inhaler, Inhale 2 puffs every 6 hours if needed for wheezing., Disp: , Rfl:   Allergies   Allergen Reactions    Pregabalin Swelling     swelling    Tramadol Other     mental status changes       Physical Examination:    General: Well developed, awake/alert/oriented x3, no distress, alert and cooperative  Skin: Warm and dry, no lesions, no rashes  ENMT: Mucous membranes moist, no apparent injury, no lesions seen  Head/Neck: Neck Supple, no apparent injury  Respiratory/Thorax: Normal breath sounds with good chest expansion, thorax symmetric  Cardiovascular: No pitting edema, no JVD    Motor Strength: 5/5 Throughout all extremities    Muscle Bulk: Normal and symmetric in all extremities    Posture:   -- Cervical: Normal  -- Thoracic: Normal  -- Lumbar : Normal  Paraspinal muscle spasm/tenderness absent.     Sensation: intact to light touch    Results:  I personally reviewed and interpreted the imaging results which included X-rays showing hardware in good position.    Assessment and Plan:    Kate Howard is a 73 y.o. year old female who presents to the spine clinic in follow up with delayed spinal fluid leak seroma that we have treated with a surgical drain and sclerotherapy. At this time, she will continue with conservative care with sclerotherapy for at least 1 week and continue with the drain. We will plan to have the drain removed in office on 1/16/2024. I will have her monitor her incision for 2-3 weeks and I will see her back in follow up. If it re-accumulates, we may have to discuss surgical intervention.     I have reviewed all prior documentation and reviewed the electronic medical record since admission. I have  personally have reviewed all advanced imaging not just the reports and used my interpretation as documented as the relevant findings. I have reviewed the risks and benefits of all treatment recommendations listed in this note with the patient and family. I spent a total of 20 minutes in service to this patient's care during this date of service.    The above clinical summary has been dictated with voice recognition software. It has not been proofread for grammatical errors, typographical mistakes, or other semantic inconsistencies.    Thank you for visiting our office today. It was our pleasure to take part in your healthcare.     Do not hesitate to call with any questions regarding your plan of care after leaving at [phone] M-F 8am-4pm.     To clinicians, thank you very much for this kind referral. It is a privilege to partner with you in the care of your patients. My office would be delighted to assist you with any further consultations or with questions regarding the plan of care outlined. Do not hesitate to call the office or contact me directly.       Sincerely,      Doug Whitehead MD  Director, [facility] [place]   of Neurosurgery, [facility] and [facility]  Complex Spine Fellowship Director  , Department of Neurological Surgery  [facility]    [address] [place] [address]  [address] [place] [address]  [address] [place] [address]    Phone: [phone]  Fax: [phone]        Scribe Attestation  By signing my name below, I, Patrice Welch   attest that this documentation has been prepared under the direction and in the presence of Doug Whitehead MD.

## 2024-02-13 ENCOUNTER — APPOINTMENT (OUTPATIENT)
Dept: RADIOLOGY | Facility: HOSPITAL | Age: 74
End: 2024-02-13
Payer: MEDICARE

## 2024-02-14 ENCOUNTER — APPOINTMENT (OUTPATIENT)
Dept: NEUROSURGERY | Facility: CLINIC | Age: 74
End: 2024-02-14
Payer: MEDICARE

## 2024-02-14 NOTE — PROGRESS NOTES
Kate Howard is here today in follow up for removal of drain and to review images. To review, *** was *** evaluated on ***. *** reported ***. On exam, *** had ***. Orders were written for ***.    Today, ***. *** had imaging completed and is here to review findings.    TREATMENTS:  PT  Home Exercise Program    SMOKER: ***  ANTICOAGULANT USE: ***    ROS x 10 is, otherwise, negative unless documented in HPI above    On Exam: Appears comfortable  A&O x 4, speech clear / fluent  Respirations even / unlabored  Abdomen without distension  WALSH  Gait    We discussed ***.

## 2024-02-15 ENCOUNTER — PREP FOR PROCEDURE (OUTPATIENT)
Dept: RADIOLOGY | Facility: HOSPITAL | Age: 74
End: 2024-02-15
Payer: MEDICARE

## 2024-02-15 DIAGNOSIS — M96.842 POSTOPERATIVE SEROMA OF MUSCULOSKELETAL STRUCTURE AFTER MUSCULOSKELETAL PROCEDURE: Primary | ICD-10-CM

## 2024-02-15 NOTE — PROGRESS NOTES
Kate Howard is a 73 y.o. female who is here for removal of drain.    To review, she was last evaluated by Dr. Doug Whitehead on 02/05/2024, for continued drainage via seroma drain placed on 01/09/2024, at [facility]. The drainage is felt to be CSF, although she denied headaches.     She was evaluated on Dr. Doug Whitehead on 10/30/2023. She then underwent placement of drain in lumbar seroma by Dr. Reji Granados at [facility] on 01/09/2024. 100 mL of serous fluid was drained at placement.     She underwent L1 - pelvis extension / revision of hardware on 08/02/2023, by Dr. Doug Whitehead at [facility].     Today, she feels she is progressing steadily. Drain continues with serous output at 50 - 100 mL / daily. She continues to deny positional headache or headache different from baseline headaches. Activity level - she is able to complete ADLs. Ambulates with cane assist.    Smoker: YES  Anticoagulation: No    On exam:  A&O x 3, speech clear / fluent  Respirations even / unlabored  WALSH  SURGICAL INCISION is: well approximated healed, no erythema / swelling / drainage  DRAIN: removed without incident; no active drainage after removal of tubing. Two 3.0 Ethilon sutures placed using sterile technique with betadine prep. Tolerated well by patient. No drainage after sutures placed  Gait is steady with use of cane    Kate Howard is progressing well post operatively. She agrees to follow up with me in 2 weeks for suture removal and re-assessment. Otherwise, she will follow up with Dr. Whitehead as previously scheduled, 03/18/2024, with lumbar spine x-rays prior to visit.

## 2024-02-16 ENCOUNTER — OFFICE VISIT (OUTPATIENT)
Dept: NEUROSURGERY | Facility: CLINIC | Age: 74
End: 2024-02-16
Payer: MEDICARE

## 2024-02-16 VITALS
BODY MASS INDEX: 38.64 KG/M2 | HEART RATE: 78 BPM | DIASTOLIC BLOOD PRESSURE: 72 MMHG | SYSTOLIC BLOOD PRESSURE: 144 MMHG | HEIGHT: 62 IN | TEMPERATURE: 97.3 F | WEIGHT: 210 LBS

## 2024-02-16 DIAGNOSIS — Z98.1 S/P LUMBAR FUSION: Primary | ICD-10-CM

## 2024-02-16 PROCEDURE — 1125F AMNT PAIN NOTED PAIN PRSNT: CPT | Performed by: NURSE PRACTITIONER

## 2024-02-16 PROCEDURE — 1157F ADVNC CARE PLAN IN RCRD: CPT | Performed by: NURSE PRACTITIONER

## 2024-02-16 PROCEDURE — 3008F BODY MASS INDEX DOCD: CPT | Performed by: NURSE PRACTITIONER

## 2024-02-16 PROCEDURE — 3078F DIAST BP <80 MM HG: CPT | Performed by: NURSE PRACTITIONER

## 2024-02-16 PROCEDURE — 1159F MED LIST DOCD IN RCRD: CPT | Performed by: NURSE PRACTITIONER

## 2024-02-16 PROCEDURE — 1160F RVW MEDS BY RX/DR IN RCRD: CPT | Performed by: NURSE PRACTITIONER

## 2024-02-16 PROCEDURE — 3077F SYST BP >= 140 MM HG: CPT | Performed by: NURSE PRACTITIONER

## 2024-02-16 PROCEDURE — 99214 OFFICE O/P EST MOD 30 MIN: CPT | Performed by: NURSE PRACTITIONER

## 2024-02-16 ASSESSMENT — PATIENT HEALTH QUESTIONNAIRE - PHQ9
2. FEELING DOWN, DEPRESSED OR HOPELESS: SEVERAL DAYS
1. LITTLE INTEREST OR PLEASURE IN DOING THINGS: NOT AT ALL
10. IF YOU CHECKED OFF ANY PROBLEMS, HOW DIFFICULT HAVE THESE PROBLEMS MADE IT FOR YOU TO DO YOUR WORK, TAKE CARE OF THINGS AT HOME, OR GET ALONG WITH OTHER PEOPLE: NOT DIFFICULT AT ALL
SUM OF ALL RESPONSES TO PHQ9 QUESTIONS 1 & 2: 1

## 2024-02-16 ASSESSMENT — PAIN SCALES - GENERAL: PAINLEVEL: 7

## 2024-02-16 NOTE — Clinical Note
Hi Dr. Whitehead: no drainage after removal of drain. She tolerated the whole thing well. I will see her in 2 weeks for suture removal.

## 2024-02-20 ENCOUNTER — TELEPHONE (OUTPATIENT)
Dept: NEUROSURGERY | Facility: CLINIC | Age: 74
End: 2024-02-20
Payer: MEDICARE

## 2024-02-23 ENCOUNTER — APPOINTMENT (OUTPATIENT)
Dept: RADIOLOGY | Facility: HOSPITAL | Age: 74
End: 2024-02-23
Payer: MEDICARE

## 2024-02-23 ENCOUNTER — HOSPITAL ENCOUNTER (EMERGENCY)
Facility: HOSPITAL | Age: 74
Discharge: HOME | End: 2024-02-24
Attending: EMERGENCY MEDICINE
Payer: MEDICARE

## 2024-02-23 ENCOUNTER — DOCUMENTATION (OUTPATIENT)
Dept: NEUROSURGERY | Facility: CLINIC | Age: 74
End: 2024-02-23
Payer: MEDICARE

## 2024-02-23 DIAGNOSIS — M54.50 ACUTE EXACERBATION OF CHRONIC LOW BACK PAIN: Primary | ICD-10-CM

## 2024-02-23 DIAGNOSIS — G89.29 ACUTE EXACERBATION OF CHRONIC LOW BACK PAIN: Primary | ICD-10-CM

## 2024-02-23 LAB
ABO GROUP (TYPE) IN BLOOD: NORMAL
ALBUMIN SERPL BCP-MCNC: 3.9 G/DL (ref 3.4–5)
ALP SERPL-CCNC: 78 U/L (ref 33–136)
ALT SERPL W P-5'-P-CCNC: 8 U/L (ref 7–45)
ANION GAP BLDV CALCULATED.4IONS-SCNC: 10 MMOL/L (ref 10–25)
ANION GAP SERPL CALC-SCNC: 18 MMOL/L (ref 10–20)
ANTIBODY SCREEN: NORMAL
APPEARANCE UR: CLEAR
APTT PPP: 24 SECONDS (ref 27–38)
AST SERPL W P-5'-P-CCNC: 14 U/L (ref 9–39)
BASE EXCESS BLDV CALC-SCNC: 2.5 MMOL/L (ref -2–3)
BASOPHILS # BLD AUTO: 0.07 X10*3/UL (ref 0–0.1)
BASOPHILS NFR BLD AUTO: 0.7 %
BILIRUB SERPL-MCNC: 0.4 MG/DL (ref 0–1.2)
BILIRUB UR STRIP.AUTO-MCNC: NEGATIVE MG/DL
BODY TEMPERATURE: 37 DEGREES CELSIUS
BUN SERPL-MCNC: 8 MG/DL (ref 6–23)
CA-I BLDV-SCNC: 1.25 MMOL/L (ref 1.1–1.33)
CALCIUM SERPL-MCNC: 9.6 MG/DL (ref 8.6–10.6)
CARDIAC TROPONIN I PNL SERPL HS: 4 NG/L (ref 0–34)
CHLORIDE BLDV-SCNC: 104 MMOL/L (ref 98–107)
CHLORIDE SERPL-SCNC: 103 MMOL/L (ref 98–107)
CO2 SERPL-SCNC: 27 MMOL/L (ref 21–32)
COLOR UR: NORMAL
CREAT SERPL-MCNC: 0.63 MG/DL (ref 0.5–1.05)
CRP SERPL-MCNC: 0.36 MG/DL
EGFRCR SERPLBLD CKD-EPI 2021: >90 ML/MIN/1.73M*2
EOSINOPHIL # BLD AUTO: 0.21 X10*3/UL (ref 0–0.4)
EOSINOPHIL NFR BLD AUTO: 2.2 %
ERYTHROCYTE [DISTWIDTH] IN BLOOD BY AUTOMATED COUNT: 14.6 % (ref 11.5–14.5)
ERYTHROCYTE [SEDIMENTATION RATE] IN BLOOD BY WESTERGREN METHOD: 11 MM/H (ref 0–30)
GLUCOSE BLDV-MCNC: 94 MG/DL (ref 74–99)
GLUCOSE SERPL-MCNC: 86 MG/DL (ref 74–99)
GLUCOSE UR STRIP.AUTO-MCNC: NORMAL MG/DL
HCO3 BLDV-SCNC: 28.4 MMOL/L (ref 22–26)
HCT VFR BLD AUTO: 38.9 % (ref 36–46)
HCT VFR BLD EST: 41 % (ref 36–46)
HGB BLD-MCNC: 12.8 G/DL (ref 12–16)
HGB BLDV-MCNC: 13.5 G/DL (ref 12–16)
HOLD SPECIMEN: NORMAL
HOLD SPECIMEN: NORMAL
IMM GRANULOCYTES # BLD AUTO: 0.15 X10*3/UL (ref 0–0.5)
IMM GRANULOCYTES NFR BLD AUTO: 1.6 % (ref 0–0.9)
INHALED O2 CONCENTRATION: 0 %
INR PPP: 1.1 (ref 0.9–1.1)
KETONES UR STRIP.AUTO-MCNC: NEGATIVE MG/DL
LACTATE BLDV-SCNC: 3.4 MMOL/L (ref 0.4–2)
LEUKOCYTE ESTERASE UR QL STRIP.AUTO: NEGATIVE
LYMPHOCYTES # BLD AUTO: 3.32 X10*3/UL (ref 0.8–3)
LYMPHOCYTES NFR BLD AUTO: 34.6 %
MCH RBC QN AUTO: 29.4 PG (ref 26–34)
MCHC RBC AUTO-ENTMCNC: 32.9 G/DL (ref 32–36)
MCV RBC AUTO: 89 FL (ref 80–100)
MONOCYTES # BLD AUTO: 0.66 X10*3/UL (ref 0.05–0.8)
MONOCYTES NFR BLD AUTO: 6.9 %
NEUTROPHILS # BLD AUTO: 5.19 X10*3/UL (ref 1.6–5.5)
NEUTROPHILS NFR BLD AUTO: 54 %
NITRITE UR QL STRIP.AUTO: NEGATIVE
NRBC BLD-RTO: 0 /100 WBCS (ref 0–0)
OXYHGB MFR BLDV: 39.2 % (ref 45–75)
PCO2 BLDV: 48 MM HG (ref 41–51)
PH BLDV: 7.38 PH (ref 7.33–7.43)
PH UR STRIP.AUTO: 6 [PH]
PLATELET # BLD AUTO: 206 X10*3/UL (ref 150–450)
PO2 BLDV: 34 MM HG (ref 35–45)
POTASSIUM BLDV-SCNC: 4.1 MMOL/L (ref 3.5–5.3)
POTASSIUM SERPL-SCNC: 3.8 MMOL/L (ref 3.5–5.3)
PROT SERPL-MCNC: 7 G/DL (ref 6.4–8.2)
PROT UR STRIP.AUTO-MCNC: NEGATIVE MG/DL
PROTHROMBIN TIME: 12.2 SECONDS (ref 9.8–12.8)
RBC # BLD AUTO: 4.36 X10*6/UL (ref 4–5.2)
RBC # UR STRIP.AUTO: NEGATIVE /UL
RH FACTOR (ANTIGEN D): NORMAL
SAO2 % BLDV: 42 % (ref 45–75)
SODIUM BLDV-SCNC: 138 MMOL/L (ref 136–145)
SODIUM SERPL-SCNC: 144 MMOL/L (ref 136–145)
SP GR UR STRIP.AUTO: 1.02
UROBILINOGEN UR STRIP.AUTO-MCNC: NORMAL MG/DL
WBC # BLD AUTO: 9.6 X10*3/UL (ref 4.4–11.3)

## 2024-02-23 PROCEDURE — 84484 ASSAY OF TROPONIN QUANT: CPT | Performed by: EMERGENCY MEDICINE

## 2024-02-23 PROCEDURE — 85025 COMPLETE CBC W/AUTO DIFF WBC: CPT | Performed by: EMERGENCY MEDICINE

## 2024-02-23 PROCEDURE — 86850 RBC ANTIBODY SCREEN: CPT | Performed by: EMERGENCY MEDICINE

## 2024-02-23 PROCEDURE — 93970 EXTREMITY STUDY: CPT | Performed by: RADIOLOGY

## 2024-02-23 PROCEDURE — 72100 X-RAY EXAM L-S SPINE 2/3 VWS: CPT

## 2024-02-23 PROCEDURE — 99285 EMERGENCY DEPT VISIT HI MDM: CPT | Mod: 25

## 2024-02-23 PROCEDURE — 36415 COLL VENOUS BLD VENIPUNCTURE: CPT | Performed by: EMERGENCY MEDICINE

## 2024-02-23 PROCEDURE — 85018 HEMOGLOBIN: CPT | Mod: 59 | Performed by: EMERGENCY MEDICINE

## 2024-02-23 PROCEDURE — 72100 X-RAY EXAM L-S SPINE 2/3 VWS: CPT | Performed by: STUDENT IN AN ORGANIZED HEALTH CARE EDUCATION/TRAINING PROGRAM

## 2024-02-23 PROCEDURE — 84132 ASSAY OF SERUM POTASSIUM: CPT | Performed by: EMERGENCY MEDICINE

## 2024-02-23 PROCEDURE — 93970 EXTREMITY STUDY: CPT

## 2024-02-23 PROCEDURE — 85610 PROTHROMBIN TIME: CPT | Performed by: STUDENT IN AN ORGANIZED HEALTH CARE EDUCATION/TRAINING PROGRAM

## 2024-02-23 PROCEDURE — 85652 RBC SED RATE AUTOMATED: CPT | Performed by: STUDENT IN AN ORGANIZED HEALTH CARE EDUCATION/TRAINING PROGRAM

## 2024-02-23 PROCEDURE — 99285 EMERGENCY DEPT VISIT HI MDM: CPT | Performed by: EMERGENCY MEDICINE

## 2024-02-23 PROCEDURE — 87040 BLOOD CULTURE FOR BACTERIA: CPT

## 2024-02-23 PROCEDURE — 81003 URINALYSIS AUTO W/O SCOPE: CPT | Performed by: EMERGENCY MEDICINE

## 2024-02-23 PROCEDURE — 86140 C-REACTIVE PROTEIN: CPT | Performed by: STUDENT IN AN ORGANIZED HEALTH CARE EDUCATION/TRAINING PROGRAM

## 2024-02-23 ASSESSMENT — LIFESTYLE VARIABLES
HAVE PEOPLE ANNOYED YOU BY CRITICIZING YOUR DRINKING: NO
HAVE YOU EVER FELT YOU SHOULD CUT DOWN ON YOUR DRINKING: NO
EVER HAD A DRINK FIRST THING IN THE MORNING TO STEADY YOUR NERVES TO GET RID OF A HANGOVER: NO
EVER FELT BAD OR GUILTY ABOUT YOUR DRINKING: NO

## 2024-02-23 ASSESSMENT — PAIN - FUNCTIONAL ASSESSMENT: PAIN_FUNCTIONAL_ASSESSMENT: 0-10

## 2024-02-23 ASSESSMENT — PAIN DESCRIPTION - PAIN TYPE: TYPE: ACUTE PAIN

## 2024-02-23 ASSESSMENT — COLUMBIA-SUICIDE SEVERITY RATING SCALE - C-SSRS
1. IN THE PAST MONTH, HAVE YOU WISHED YOU WERE DEAD OR WISHED YOU COULD GO TO SLEEP AND NOT WAKE UP?: NO
2. HAVE YOU ACTUALLY HAD ANY THOUGHTS OF KILLING YOURSELF?: NO
6. HAVE YOU EVER DONE ANYTHING, STARTED TO DO ANYTHING, OR PREPARED TO DO ANYTHING TO END YOUR LIFE?: NO

## 2024-02-23 ASSESSMENT — PAIN SCALES - GENERAL: PAINLEVEL_OUTOF10: 8

## 2024-02-23 NOTE — ED TRIAGE NOTES
BACK SURGERY, L1-pelvis extension/revision of hardware arthrodesis Navigation, 8/2/23, ASPIRATED 3-5X WITHOUT RESULTS AND GOT THE DRAIN PLACE 1/9/24. DRAIN REMOVED 2/16/24, SWELLING BEGAN SAME DAY AND PT STARTED FEELINGS FEVERISH APPROX 3-5 DAYS AGO. HIGHEST TEMP 101.5F. DENIES N/V, DIZZINESS, BV, HA, SOB, CP, ABD PAIN. DENIES LOSS OF BOWEL/BLADDER, NUMBNESS/TINGLING BEYOND HER BASELINE.

## 2024-02-23 NOTE — ED PROVIDER NOTES
CC: Back Pain     HPI:  Patient is a 73-year-old female with past medical history of arthritis,, diabetes, neuropathy and multiple lumbar spinal surgeries, most recent one being in August 2023 who is presenting to the emergency department with lumbar back pain and swelling.  Patient states that the fluid in her back has been aspirated 3-5 times since her surgery and eventually they placed a drain in January which was then removed approximately 6 days ago.  Since removal of the drain the patient has noticed the fluid return in addition to pain.  Patient also notes that she had an episode of fever yesterday and this morning and has been taking Tylenol for her pain which has been helping control her fever as well.  She denies any chest pain, shortness of breath, headache, nausea, vomiting, abdominal pain, any urinary retention or loss of bladder, does note some numbness and tingling into her groin from her right side however she states this is chronic for her.  She spoke to her neurosurgeon/the NP that works for the team in regards to her symptoms in addition to seeing PCP thinking this was may be a UTI which it was not and it was recommended that the patient come into the emergency department for further evaluation.  No other associate signs or symptoms reported at this time.    Limitations to History: none  Additional History provided by: N/A    External Records Reviewed:  Recent available ED and inpatient notes reviewed in EMR.  I reviewed the patient's discharge summary from August 2023.    PMHx/PSHx:  Per HPI.   - has a past medical history of Personal history of other diseases of the musculoskeletal system and connective tissue and Personal history of other endocrine, nutritional and metabolic disease.  - has a past surgical history that includes Other surgical history (02/07/2020); Other surgical history (02/07/2020); Other surgical history (02/07/2020); US guided abscess fluid collection drainage (7/27/2022);  and IR body drain placement (1/9/2024).    Medications:  Reviewed in EMR. See EMR for complete list of medications and doses.    Allergies:  Pregabalin and Tramadol    Social History:  - Tobacco:  reports that she has been smoking cigarettes. She has never used smokeless tobacco.   - Alcohol:  reports no history of alcohol use.   - Illicit Drugs:  reports no history of drug use.     ROS:  Per HPI.     ???????????????????????????????????????????????????????????????  Triage Vitals:  T 36.2 °C (97.2 °F)  HR 90  BP (!) 174/94  RR 18  O2 96 %      Physical Exam  Constitutional:       General: She is not in acute distress.     Appearance: Normal appearance. She is not toxic-appearing.   HENT:      Head: Normocephalic and atraumatic.      Mouth/Throat:      Mouth: Mucous membranes are moist.   Eyes:      General: No scleral icterus.     Conjunctiva/sclera: Conjunctivae normal.   Cardiovascular:      Rate and Rhythm: Normal rate and regular rhythm.      Pulses: Normal pulses.   Pulmonary:      Effort: Pulmonary effort is normal.      Breath sounds: Normal breath sounds.   Abdominal:      General: There is no distension.      Palpations: Abdomen is soft.      Tenderness: There is no abdominal tenderness.   Musculoskeletal:         General: Normal range of motion.      Cervical back: Normal range of motion and neck supple.      Right lower leg: Edema present.      Left lower leg: Edema present.      Comments: Lumbar back tenderness in addition to swelling over lower lumbar spine, appears to be a movable fluid pocket.  No surrounding erythema.  Well-healed surgical scars.   Skin:     General: Skin is warm and dry.   Neurological:      General: No focal deficit present.      Mental Status: She is alert.   Psychiatric:         Mood and Affect: Mood normal.         Behavior: Behavior normal.         Thought Content: Thought content normal.         Judgment: Judgment normal.      ???????????????????????????????????????????????????????????????  ED Course:  ED Course as of 02/23/24 2350   Fri Feb 23, 2024   2307 ED Senior resident attestation: 73-year-old female with history of multiple prior back surgeries complicated by postop fluid collection status post drain placement and removal within the last month who presents for lower back fluid reaccumulation.  Patient also reports a fever at home to 101, denies any new weakness, numbness, tingling, pain.  We did proceed with neurosurgery consultation in addition to obtaining basic labs, inflammatory markers.  Per neurosurgery, recommending CT imaging as well as bilateral venous duplex.  Disposition pending imaging and neurosurgery recommendations. [DAVID]      ED Course User Index  [DAVID] Russell Gibson, DO       EKG & Images:  Independently reviewed, See ED Course      MDM:  -Patient is a 73-year-old female who presents to the emergency department with a fluid collection in her lower back and it was recommended by neurosurgery for the patient to come to the ED.  Patient also had 2 febrile episodes which also prompted them to recommend that she come to the ED.  Patient had spine surgery in August 2023 and had a drain placed in January that was removed approximately 6 days ago.  Labs and x-ray of the lumbar spine was done which showed no acute findings.  Neurosurgery was consulted, who recommended an MRI however patient is unable to have an MRI due to metal in her eye that has been there for approximately 20 years and she has not had an MRI since the placement of this metal.  Spoke to neurosurgery again who recommended CT lumbar spine with and without contrast, pending those results will determine whether or not the patient needs to be admitted to their service.  Patient signed out to oncoming provider at 2300, pending CT and neurosurgery recommendations.    Final diagnoses:   None         Social Determinants Limiting  "Care:      Disposition:  Handoff    Trina \"Carlos\" Darion  Emergency Medicine Resident, PGY1  [facility]   This patient was seen and staffed with Dr. Nielsen and Dr. Gibson    Disclaimer: This note was dictated by speech recognition. Minor errors in transcription may be present    Procedures ? SmartLinks last updated 2/23/2024 5:25 PM            Trina Orlando,   Resident  02/23/24 9762    -----------------------------    ED Attending Attestation Note:    This patient was seen by the resident physician. I have seen and examined the patient, agree with the workup, evaluation, management and diagnosis. The care plan has been discussed and I concur.    My assessment reveals the following:    HPI:  Patient is a 72 y/o female with h/o lumbar spine surgery by NSGY Dr. Jesse Whitehead in August 2023 to fix some screws and since then has had fluid in her spine s/p IR drain placed at [facility] which was removed 1 week ago. Since then, patient has been having intermittent fevers with Tmax 101F and a different kind of pain from baseline described as sharp and like sitting on needles that also radiates. Called Dr. Whitehead's office yesterday and received a callback this AM and was told to come to ED for evaluation by Dr. Whitehead's nurse. No CP/SOB/abd pain. Ambulation is baseline. No new incontinence. Took Percocet at home for pain.     PE:  Vital signs reviewed in nursing triage note, EMR flowsheets, and at patient's bedside  GEN: Patient is awake, alert, calm, cooperative, and in mild painful distress.  HEAD: Normocephalic and atraumatic.  EYES: Anicteric sclera.  MOUTH: Mucous membranes moist.  CV: Regular rate and rhythm. (+) s1/s2. No murmurs/rubs/gallops.  PULM: CTAB. No wheezes, rales, or crackles.  GI: Soft, non-tender, non-distended without rebound or guarding.  EXT: No deformities noted. Full range of motion at all major joints. Non-tender.  BACK: Mild midline LS spine tenderness to palpation. " No LS paravertebral tenderness to palpation. No midline T spine tenderness to palpation. No T spine paravertebral tenderness to palpation.  NEURO: Moves all extremities. No gross focal deficits. Sensation grossly intact throughout. Ambulatory to bathroom.   SKIN: Warm, dry. No erythema or ecchymosis.    Medical Decision Making:  - IV  - Labs  - NSGY consult  - Lumbar spine x-rays  - Pain meds offered, but declined.   - BLE duplex  - CT L spine with and without contrast per NSGY    Differential Diagnoses Considered: Epidural fluid collection, Epidural abscess.     Chronic Medical Conditions Significantly Affecting Care: Lumbar spine surgeries    MD Dong Patel MD  02/24/24 3745

## 2024-02-23 NOTE — PROGRESS NOTES
Phone with patient, after discussing recurrence of lumbar swelling (drain removed on 02/16/2024), and fever to 100 - 101 degrees F.  Asked her to present to [facility] ED as soon as possible. She states her  is also having medical issues, and states she will go to ED early afternoon. She has not checked temperature this morning, but feels she is febrile.  Once again, encouraged going to ED quickly for further evaluation.  Lydia Malhotra, APRN-CNP

## 2024-02-24 ENCOUNTER — APPOINTMENT (OUTPATIENT)
Dept: RADIOLOGY | Facility: HOSPITAL | Age: 74
End: 2024-02-24
Payer: MEDICARE

## 2024-02-24 VITALS
TEMPERATURE: 98.2 F | BODY MASS INDEX: 39.56 KG/M2 | HEIGHT: 62 IN | DIASTOLIC BLOOD PRESSURE: 77 MMHG | RESPIRATION RATE: 16 BRPM | WEIGHT: 215 LBS | OXYGEN SATURATION: 96 % | SYSTOLIC BLOOD PRESSURE: 128 MMHG | HEART RATE: 84 BPM

## 2024-02-24 LAB — HOLD SPECIMEN: NORMAL

## 2024-02-24 PROCEDURE — 2550000001 HC RX 255 CONTRASTS: Performed by: EMERGENCY MEDICINE

## 2024-02-24 PROCEDURE — 72132 CT LUMBAR SPINE W/DYE: CPT

## 2024-02-24 PROCEDURE — 72132 CT LUMBAR SPINE W/DYE: CPT | Performed by: RADIOLOGY

## 2024-02-24 RX ADMIN — IOHEXOL 80 ML: 350 INJECTION, SOLUTION INTRAVENOUS at 01:07

## 2024-02-24 NOTE — CONSULTS
Reason For Consult  Fluid underlying wound     History Of Present Illness  Kate Howard is a 73 y.o. female with h/o HTN, JUAN CARLOS, DM, HypoT, factor V leiden (not on AC), cerebral aneurysm s/p repair over 20 years ago, multiple lumbar surgeries including spinal cord stimulator s/p removal, 4/2022 s/p L2-S1 fusion, c/b pseudoarthrosis, 8/2023 s/p L1-pelvis extension/revision of prior fusion, recently complicated by recurrent fluid collection, s/p seroma drain placement on 1/9 (100ml serous fluid drained), removed at 2/16, p/w recurrent fluid collection underneath incision and intermittent fevers.     Patient overall feels well and says that she has a history of intermittent fevers throughout her life.  However what was different now is that these fevers occur in the morning and not at night.  In addition to not lasting all day like the one this past Saturday was.  She does not not complain of any incisional tenderness or back pain at this time.       Past Medical History  She has a past medical history of Personal history of other diseases of the musculoskeletal system and connective tissue and Personal history of other endocrine, nutritional and metabolic disease.    Surgical History  She has a past surgical history that includes Other surgical history (02/07/2020); Other surgical history (02/07/2020); Other surgical history (02/07/2020); US guided abscess fluid collection drainage (7/27/2022); and IR body drain placement (1/9/2024).     Social History  She reports that she has been smoking cigarettes. She has never used smokeless tobacco. She reports that she does not drink alcohol and does not use drugs.    Family History  Family History   Problem Relation Name Age of Onset    Other (cardiac disorder) Other      Hypertension Other      Cancer Other          Allergies  Pregabalin and Tramadol    Review of Systems  Negative 12 point review of systems other than stated in HPI      Physical Exam  GEN: AOx3. Normal  "memory, mood, and affect  CV: well perfused   LUNGS: breathing comfortably on room air   ABD: Soft, NT/ND, NBS, no masses or organomegaly.?  : N/A?SKIN: incision well healed with palpable fluctuance at bottom of incision, no erythema or tenderness   MSK: Normal gait. No deformities.?  Neuro:   Awake, Ox3   BUE 5/5  BLE 5/5   SILT      Last Recorded Vitals  Blood pressure 149/74, pulse 84, temperature 36.2 °C (97.2 °F), resp. rate 16, height 1.575 m (5' 2\"), weight 97.5 kg (215 lb), SpO2 95 %.    Relevant Results  No acute imaging to review      Assessment/Plan   Kate Howard is a 73 y.o. female with h/o HTN, JUAN CARLOS, DM, HypoT, factor V leiden (not on AC), cerebral aneurysm s/p repair over 20 years ago, multiple lumbar surgeries including spinal cord stimulator s/p removal, 4/2022 s/p L2-S1 fusion, c/b pseudoarthrosis, 8/2023 s/p L1-pelvis extension/revision of prior fusion, c/b recurrent fluid collection, 1/9 s/p seroma drain (removed 2/16), p/w recurrent collection, intermittent fevers     -please take MRI L-spine with and without contrast to evaluate for underlying infection  - Please also obtain preop labs in addition to inflammatory markers  - Will continue to follow    Plan was discussed with chief resident and attending Dr. Whitehead. Recommendations not final until note signed by attending.     Ottoniel Corral MD    "

## 2024-02-24 NOTE — DISCHARGE INSTRUCTIONS
You were seen today due to concerns of possible infection given the recent removal of your drain followed by fever.  Your scans look intervally improved per neurosurgical evaluation.  Please follow-up with them as scheduled.  Please return if you have any worsening concerns.

## 2024-02-24 NOTE — PROGRESS NOTES
I assumed care of this patient at 2300 hrs.    Please see initial provider note for full HPI.  Briefly this is a 73-year-old female with history of arthritis, diabetes and multiple lumbar spinal surgeries presenting due to fever and worsening back pain since surgical drain was removed 6 days ago.  Given the fact that patient was unable to undergo MRI for evaluation of epidural abscess versus osteomyelitis, CT of the lumbar spine with and without contrast was obtained.  Per neurosurgical read of the patient's CT and looks intervally improved.  Patient has close neurosurgical follow-up and was told to give her appointment.  Patient felt comfortable with this plan was discharged in hemodynamically stable condition.  Patient care was overseen by attending physician agrees with the plan disposition.    Shari Yee MD  Emergency Medicine - PGY2  [facility]  [address] [facility] [address]

## 2024-02-27 LAB
BACTERIA BLD CULT: NORMAL
BACTERIA BLD CULT: NORMAL

## 2024-03-07 NOTE — PROGRESS NOTES
Kate Howard is here today in follow up for suture removal and to review images.     PMH/PSH:  h/o HTN, JUAN CARLOS, DM, HypoT, factor V leiden (not on AC), cerebral aneurysm s/p repair over 20 years ago, multiple lumbar surgeries including spinal cord stimulator s/p removal, 4/2022 s/p L2-S1 fusion, c/b pseudoarthrosis, 8/2023 s/p L1-pelvis extension / revision of prior fusion, recently complicated by recurrent fluid collection, s/p seroma drain placement on 1/9 (100ml serous fluid drained), removed at 2/16, p/w recurrent fluid collection underneath incision and intermittent fevers.      To review, she was last evaluated at [facility] ED on 02/23/2024, with persistent lumbar post operative swelling. She reported feeling feverish, but had no fever on exam. On exam, her lumbar incision had palpable fluctuance.     Note is make of CT L Spine image extraction  at [facility] earlier today.    Today, she continues with swelling in left gluteal region. Continue with afternoon fevers, and admits this is normal for  her.    TREATMENTS:  IR placed lumbar drain 01/09/2024 - 02/16/2024    SMOKER: YES  ANTICOAGULANT USE: No    ROS x 10 is, otherwise, negative unless documented in HPI above    On Exam: Appears comfortable  A&O x 4, speech clear / fluent  Respirations even / unlabored  Abdomen without distension  Lumbar incision: well healed  Sutures from drain site removed  Gait is steady with use of cane    We discussed results of CT L Spine done in ED on 02/23/2024 (no new findings). She agrees to follow up with Dr. Whitehead as previously scheduled, on 03/18/2024.

## 2024-03-08 ENCOUNTER — OFFICE VISIT (OUTPATIENT)
Dept: NEUROSURGERY | Facility: CLINIC | Age: 74
End: 2024-03-08
Payer: MEDICARE

## 2024-03-08 VITALS
WEIGHT: 210 LBS | HEART RATE: 77 BPM | HEIGHT: 62 IN | SYSTOLIC BLOOD PRESSURE: 132 MMHG | BODY MASS INDEX: 38.64 KG/M2 | DIASTOLIC BLOOD PRESSURE: 78 MMHG | TEMPERATURE: 97.4 F

## 2024-03-08 DIAGNOSIS — Z98.1 S/P LUMBAR FUSION: Primary | ICD-10-CM

## 2024-03-08 PROCEDURE — 3078F DIAST BP <80 MM HG: CPT | Performed by: NURSE PRACTITIONER

## 2024-03-08 PROCEDURE — 3075F SYST BP GE 130 - 139MM HG: CPT | Performed by: NURSE PRACTITIONER

## 2024-03-08 PROCEDURE — 3008F BODY MASS INDEX DOCD: CPT | Performed by: NURSE PRACTITIONER

## 2024-03-08 PROCEDURE — 99213 OFFICE O/P EST LOW 20 MIN: CPT | Performed by: NURSE PRACTITIONER

## 2024-03-08 PROCEDURE — 1159F MED LIST DOCD IN RCRD: CPT | Performed by: NURSE PRACTITIONER

## 2024-03-08 PROCEDURE — 1157F ADVNC CARE PLAN IN RCRD: CPT | Performed by: NURSE PRACTITIONER

## 2024-03-08 PROCEDURE — 1125F AMNT PAIN NOTED PAIN PRSNT: CPT | Performed by: NURSE PRACTITIONER

## 2024-03-08 PROCEDURE — 1160F RVW MEDS BY RX/DR IN RCRD: CPT | Performed by: NURSE PRACTITIONER

## 2024-03-08 ASSESSMENT — PAIN SCALES - GENERAL: PAINLEVEL: 8

## 2024-03-08 ASSESSMENT — PATIENT HEALTH QUESTIONNAIRE - PHQ9
SUM OF ALL RESPONSES TO PHQ9 QUESTIONS 1 & 2: 0
2. FEELING DOWN, DEPRESSED OR HOPELESS: NOT AT ALL
1. LITTLE INTEREST OR PLEASURE IN DOING THINGS: NOT AT ALL

## 2024-03-18 ENCOUNTER — OFFICE VISIT (OUTPATIENT)
Dept: NEUROSURGERY | Facility: CLINIC | Age: 74
End: 2024-03-18
Payer: MEDICARE

## 2024-03-18 VITALS
HEART RATE: 87 BPM | BODY MASS INDEX: 41.41 KG/M2 | WEIGHT: 225 LBS | DIASTOLIC BLOOD PRESSURE: 68 MMHG | SYSTOLIC BLOOD PRESSURE: 128 MMHG | HEIGHT: 62 IN | TEMPERATURE: 97.3 F

## 2024-03-18 DIAGNOSIS — M48.062 SPINAL STENOSIS OF LUMBAR REGION WITH NEUROGENIC CLAUDICATION: Primary | ICD-10-CM

## 2024-03-18 PROCEDURE — 99215 OFFICE O/P EST HI 40 MIN: CPT | Performed by: STUDENT IN AN ORGANIZED HEALTH CARE EDUCATION/TRAINING PROGRAM

## 2024-03-18 PROCEDURE — 1157F ADVNC CARE PLAN IN RCRD: CPT | Performed by: STUDENT IN AN ORGANIZED HEALTH CARE EDUCATION/TRAINING PROGRAM

## 2024-03-18 PROCEDURE — 3074F SYST BP LT 130 MM HG: CPT | Performed by: STUDENT IN AN ORGANIZED HEALTH CARE EDUCATION/TRAINING PROGRAM

## 2024-03-18 PROCEDURE — 3078F DIAST BP <80 MM HG: CPT | Performed by: STUDENT IN AN ORGANIZED HEALTH CARE EDUCATION/TRAINING PROGRAM

## 2024-03-18 PROCEDURE — 1125F AMNT PAIN NOTED PAIN PRSNT: CPT | Performed by: STUDENT IN AN ORGANIZED HEALTH CARE EDUCATION/TRAINING PROGRAM

## 2024-03-18 PROCEDURE — 1159F MED LIST DOCD IN RCRD: CPT | Performed by: STUDENT IN AN ORGANIZED HEALTH CARE EDUCATION/TRAINING PROGRAM

## 2024-03-18 PROCEDURE — 3008F BODY MASS INDEX DOCD: CPT | Performed by: STUDENT IN AN ORGANIZED HEALTH CARE EDUCATION/TRAINING PROGRAM

## 2024-03-18 PROCEDURE — 1160F RVW MEDS BY RX/DR IN RCRD: CPT | Performed by: STUDENT IN AN ORGANIZED HEALTH CARE EDUCATION/TRAINING PROGRAM

## 2024-03-18 RX ORDER — TOPIRAMATE 25 MG/1
TABLET ORAL
COMMUNITY
Start: 2024-03-13

## 2024-03-18 RX ORDER — GLIMEPIRIDE 2 MG/1
2 TABLET ORAL DAILY
COMMUNITY
Start: 2024-03-04

## 2024-03-18 ASSESSMENT — PATIENT HEALTH QUESTIONNAIRE - PHQ9
10. IF YOU CHECKED OFF ANY PROBLEMS, HOW DIFFICULT HAVE THESE PROBLEMS MADE IT FOR YOU TO DO YOUR WORK, TAKE CARE OF THINGS AT HOME, OR GET ALONG WITH OTHER PEOPLE: SOMEWHAT DIFFICULT
1. LITTLE INTEREST OR PLEASURE IN DOING THINGS: SEVERAL DAYS
SUM OF ALL RESPONSES TO PHQ9 QUESTIONS 1 & 2: 2
2. FEELING DOWN, DEPRESSED OR HOPELESS: SEVERAL DAYS

## 2024-03-18 ASSESSMENT — LIFESTYLE VARIABLES
AUDIT-C TOTAL SCORE: -1
HOW MANY STANDARD DRINKS CONTAINING ALCOHOL DO YOU HAVE ON A TYPICAL DAY: PATIENT DECLINED
SKIP TO QUESTIONS 9-10: 0
HOW OFTEN DO YOU HAVE A DRINK CONTAINING ALCOHOL: PATIENT DECLINED
HOW OFTEN DO YOU HAVE SIX OR MORE DRINKS ON ONE OCCASION: PATIENT DECLINED

## 2024-03-18 ASSESSMENT — PAIN SCALES - GENERAL: PAINLEVEL: 8

## 2024-03-18 NOTE — PROGRESS NOTES
[facility] [place]  Department of Neurological Surgery  Established Patient Visit    History of Present Illness  Kate Howard is a 74 y.o. year old female who presents to the spine clinic in follow up with prior history that is fairly complicated.  She underwent a L2-S1 extension revision hardware in 2020 and subsequently developed loosening of the hardware at L2 and subsequently underwent an extension revision of hardware L1 to pelvis.  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  The pain radiates through her back and all the way down into her hips.  She is walking with a cane.  She can only walk very short distances.  She is currently smoking.  She had a chronic seroma after surgery and this has returned to approximately 50% of what it was when I saw her last approximately 3 to 6 months ago.    Patient's BMI is Body mass index is 41.15 kg/m².    14/14 systems reviewed and negative other than what is listed in the history of present illness    Patient Active Problem List   Diagnosis    Postprocedural seroma of a musculoskeletal structure following a musculoskeletal system procedure    Pyriformis syndrome    Reflux esophagitis    S/P insertion of spinal cord stimulator    S/P lumbar fusion    Sacroiliitis (CMS/HCC)    SI (stress incontinence), female    Sleep apnea    Spinal cord stimulator dysfunction (CMS/HCC)    Tobacco dependency    Uterovaginal prolapse, incomplete    Vitamin B12 deficiency    Wound of right foot    Pneumothorax on left    Pneumonia    Peripheral neuropathy    Obesity    Morbid obesity due to excess calories (CMS/HCC)    Lupus (CMS/HCC)    Lumbosacral spondylosis    Lumbosacral radiculitis    Lumbar spinal stenosis    Intracranial aneurysm    Incisional irritation    ROXIE (iron deficiency anemia)     IBS (irritable bowel syndrome)    Hypothyroidism    HTN (hypertension)    Hiatal hernia    Gastric ulcer    Female cystocele    Factor V deficiency (CMS/HCC)    Factor 5 Leiden mutation, heterozygous (CMS/HCC)    Herniation of rectum into vagina    Edema of lower extremity    Type 2 diabetes mellitus (CMS/HCC)    Diabetes (CMS/HCC)    Elevated lipids    Chronic pain disorder    Cervicogenic headache    Cataract    Bilateral knee pain    Arthritis    Anemia    Acute UTI    Adverse effect of compound iron preparation     Past Medical History:   Diagnosis Date    Personal history of other diseases of the musculoskeletal system and connective tissue     History of arthritis    Personal history of other endocrine, nutritional and metabolic disease     History of diabetes mellitus     Past Surgical History:   Procedure Laterality Date    IR BODY DRAIN PLACEMENT  1/9/2024    IR BODY DRAIN PLACEMENT 1/9/2024 Anneliese Granados MD [facility]    OTHER SURGICAL HISTORY  02/07/2020    Cholecystectomy    OTHER SURGICAL HISTORY  02/07/2020    Back surgery    OTHER SURGICAL HISTORY  02/07/2020    Hernia repair    US GUIDED ABSCESS FLUID COLLECTION DRAINAGE  7/27/2022    US GUIDED ABSCESS FLUID COLLECTION DRAINAGE 7/27/2022 [facility]     Social History     Tobacco Use    Smoking status: Every Day     Types: Cigarettes    Smokeless tobacco: Never   Substance Use Topics    Alcohol use: Never     family history includes Cancer in an other family member; Hypertension in an other family member; cardiac disorder in an other family member.    Current Outpatient Medications:     acetaminophen (TylenoL) 325 mg capsule, 2 capsules (650 mg)., Disp: , Rfl:     albuterol 90 mcg/actuation inhaler, Inhale 2 puffs every 6 hours if needed for wheezing., Disp: , Rfl:     amLODIPine (Norvasc) 5 mg tablet, Take 1 tablet (5 mg) by mouth once daily., Disp: , Rfl:     cyanocobalamin (Vitamin B-12) 1,000 mcg tablet, Take 1 tablet (1,000 mcg) by  mouth once daily., Disp: , Rfl:     furosemide (Lasix) 20 mg tablet, 1 tablet (20 mg)., Disp: , Rfl:     gabapentin (Neurontin) 600 mg tablet, , Disp: , Rfl:     glimepiride (Amaryl) 2 mg tablet, Take 1 tablet (2 mg) by mouth once daily., Disp: , Rfl:     levothyroxine (Synthroid, Levoxyl) 125 mcg tablet, Take by mouth., Disp: , Rfl:     metFORMIN (Glucophage) 1,000 mg tablet, Take by mouth., Disp: , Rfl:     omeprazole (PriLOSEC) 40 mg DR capsule, , Disp: , Rfl:     topiramate (Topamax) 25 mg tablet, TAKE 1 TABLET BY MOUTH EVERY DAY AT BEDTIME THEN INCREASE TO 2 TABLETS AT BEDTIME, Disp: , Rfl:     triamterene-hydrochlorothiazid (Maxzide-25) 37.5-25 mg tablet, Take by mouth., Disp: , Rfl:     oxyCODONE (Roxicodone) 5 mg immediate release tablet, , Disp: , Rfl:     oxyCODONE-acetaminophen (Percocet)  mg tablet, , Disp: , Rfl:   Allergies   Allergen Reactions    Pregabalin Swelling     swelling    Tramadol Other     mental status changes       Physical Examination:    General: Well developed, awake/alert/oriented x3, no distress, alert and cooperative  Skin: Warm and dry, no lesions, no rashes  ENMT: Mucous membranes moist, no apparent injury, no lesions seen  Head/Neck: Neck Supple, no apparent injury  Respiratory/Thorax: Normal breath sounds with good chest expansion, thorax symmetric  Cardiovascular: No pitting edema, no JVD    Motor Strength: 5/5 Throughout all extremities    Muscle Bulk: Normal and symmetric in all extremities    Posture:   -- Cervical: Normal  -- Thoracic: Normal  -- Lumbar : Normal  Paraspinal muscle spasm/tenderness back pain.     Sensation: intact to light touch  Lumbar radiculopathy in the L2 and L3 distribution    Results:  I personally reviewed and interpreted the imaging results which included CT scan of the lumbar spine shows lucency around the screws at L1 with proximal junctional kyphosis and endplate deformity along the superior aspect of the L1 vertebral body and a large  posterior fluid collection most likely consistent with a seroma    Assessment and Plan:      Kate Howard is a 74 y.o. year old female who presents to the spine clinic in follow up with proximal junctional kyphosis above an L1 to pelvis instrumented fusion.  I reviewed her scoliosis x-rays from July 2023 which show a pelvic incidence of approximately 55 degrees and a lumbar lordosis of before her prior surgery of approximately 25 degrees.  I had discussed originally doing a scoliosis deformity correction with her but we elected to perform just a extension revision of her hardware.  Unfortunately she has now developed proximal junctional kyphosis above the extension revision and she needs a scoliosis deformity correction which I counseled her upon today.  She must stop smoking she should get new scoliosis x-rays today for evaluation.  I explained to her that surgery would be the last resort but it would involve a pedicle subtraction osteotomy at this time to restore her alignment and correct her lumbar lordosis.  I long discussion with her about the risk and benefits of the surgery.  We would have to take the hardware up to T10 and revise it all the way to the pelvis and then perform the correction maneuvers which she is aware of.  After she quit smoking she is going to call my office if she would like to move forward with surgery.      I have reviewed all prior documentation and reviewed the electronic medical record since admission. I have personally have reviewed all advanced imaging not just the reports and used my interpretation as documented as the relevant findings. I have reviewed the risks and benefits of all treatment recommendations listed in this note with the patient and family. I spent a total of 45 minutes in service to this patient's care during this date of service.      The above clinical summary has been dictated with voice recognition software. It has not been proofread for grammatical errors,  typographical mistakes, or other semantic inconsistencies.    Thank you for visiting our office today. It was our pleasure to take part in your healthcare.     Do not hesitate to call with any questions regarding your plan of care after leaving at [phone] M-F 8am-4pm.     To clinicians, thank you very much for this kind referral. It is a privilege to partner with you in the care of your patients. My office would be delighted to assist you with any further consultations or with questions regarding the plan of care outlined. Do not hesitate to call the office or contact me directly.       Sincerely,      Doug Whitehead MD, [facility]  Spine , [facility]  Stevan Peñaloza and Danae Peñaloza Chair in Spinal Neurosurgery  Neurosurgery , [facility] and [facility]  Complex Spine Surgery Fellowship Director   of Neurological Surgery  [employer]    [facility]  [address] [place] [address] [address] [place] [id number]    [facility]  [address] [place] [address]  [address] [place] [id number]    Phone: [phone]  Fax: [phone]

## 2024-03-22 ENCOUNTER — HOSPITAL ENCOUNTER (OUTPATIENT)
Dept: RADIOLOGY | Facility: HOSPITAL | Age: 74
Discharge: HOME | End: 2024-03-22
Payer: MEDICARE

## 2024-03-22 DIAGNOSIS — M48.062 SPINAL STENOSIS OF LUMBAR REGION WITH NEUROGENIC CLAUDICATION: ICD-10-CM

## 2024-03-22 DIAGNOSIS — Z98.1 S/P LUMBAR FUSION: ICD-10-CM

## 2024-03-22 PROCEDURE — 72082 X-RAY EXAM ENTIRE SPI 2/3 VW: CPT | Performed by: INTERNAL MEDICINE

## 2024-03-22 PROCEDURE — 72082 X-RAY EXAM ENTIRE SPI 2/3 VW: CPT

## 2024-04-03 ENCOUNTER — TELEPHONE (OUTPATIENT)
Dept: NEUROSURGERY | Facility: CLINIC | Age: 74
End: 2024-04-03
Payer: MEDICARE

## 2024-05-01 ENCOUNTER — OFFICE VISIT (OUTPATIENT)
Dept: NEUROSURGERY | Facility: CLINIC | Age: 74
End: 2024-05-01
Payer: MEDICARE

## 2024-05-01 VITALS
HEIGHT: 62 IN | DIASTOLIC BLOOD PRESSURE: 58 MMHG | SYSTOLIC BLOOD PRESSURE: 122 MMHG | TEMPERATURE: 97.1 F | HEART RATE: 81 BPM | WEIGHT: 220 LBS | BODY MASS INDEX: 40.48 KG/M2

## 2024-05-01 DIAGNOSIS — M41.9 SCOLIOSIS OF THORACIC SPINE, UNSPECIFIED SCOLIOSIS TYPE: Primary | ICD-10-CM

## 2024-05-01 PROCEDURE — 3008F BODY MASS INDEX DOCD: CPT | Performed by: NURSE PRACTITIONER

## 2024-05-01 PROCEDURE — 1159F MED LIST DOCD IN RCRD: CPT | Performed by: NURSE PRACTITIONER

## 2024-05-01 PROCEDURE — 99213 OFFICE O/P EST LOW 20 MIN: CPT | Performed by: NURSE PRACTITIONER

## 2024-05-01 PROCEDURE — 1160F RVW MEDS BY RX/DR IN RCRD: CPT | Performed by: NURSE PRACTITIONER

## 2024-05-01 PROCEDURE — 3078F DIAST BP <80 MM HG: CPT | Performed by: NURSE PRACTITIONER

## 2024-05-01 PROCEDURE — 1125F AMNT PAIN NOTED PAIN PRSNT: CPT | Performed by: NURSE PRACTITIONER

## 2024-05-01 PROCEDURE — 1157F ADVNC CARE PLAN IN RCRD: CPT | Performed by: NURSE PRACTITIONER

## 2024-05-01 PROCEDURE — 3074F SYST BP LT 130 MM HG: CPT | Performed by: NURSE PRACTITIONER

## 2024-05-01 ASSESSMENT — PATIENT HEALTH QUESTIONNAIRE - PHQ9
1. LITTLE INTEREST OR PLEASURE IN DOING THINGS: SEVERAL DAYS
2. FEELING DOWN, DEPRESSED OR HOPELESS: SEVERAL DAYS
SUM OF ALL RESPONSES TO PHQ9 QUESTIONS 1 AND 2: 2
10. IF YOU CHECKED OFF ANY PROBLEMS, HOW DIFFICULT HAVE THESE PROBLEMS MADE IT FOR YOU TO DO YOUR WORK, TAKE CARE OF THINGS AT HOME, OR GET ALONG WITH OTHER PEOPLE: SOMEWHAT DIFFICULT

## 2024-05-01 ASSESSMENT — PAIN SCALES - GENERAL: PAINLEVEL: 8

## 2024-05-01 ASSESSMENT — ENCOUNTER SYMPTOMS: OCCASIONAL FEELINGS OF UNSTEADINESS: 1
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
## 2024-05-30 ENCOUNTER — APPOINTMENT (OUTPATIENT)
Dept: NEUROSURGERY | Facility: CLINIC | Age: 74
End: 2024-05-30
Payer: MEDICARE

## 2024-09-12 ENCOUNTER — OFFICE VISIT (OUTPATIENT)
Facility: CLINIC | Age: 74
End: 2024-09-12
Payer: MEDICARE

## 2024-09-12 VITALS
WEIGHT: 224.4 LBS | TEMPERATURE: 97.6 F | HEIGHT: 62 IN | SYSTOLIC BLOOD PRESSURE: 144 MMHG | DIASTOLIC BLOOD PRESSURE: 72 MMHG | BODY MASS INDEX: 41.3 KG/M2 | HEART RATE: 76 BPM

## 2024-09-12 DIAGNOSIS — M41.35 THORACOGENIC SCOLIOSIS OF THORACOLUMBAR REGION: Primary | ICD-10-CM

## 2024-09-12 RX ORDER — NALOXONE HYDROCHLORIDE 4 MG/.1ML
SPRAY NASAL
COMMUNITY
Start: 2024-03-27

## 2024-09-12 ASSESSMENT — PATIENT HEALTH QUESTIONNAIRE - PHQ9
10. IF YOU CHECKED OFF ANY PROBLEMS, HOW DIFFICULT HAVE THESE PROBLEMS MADE IT FOR YOU TO DO YOUR WORK, TAKE CARE OF THINGS AT HOME, OR GET ALONG WITH OTHER PEOPLE: SOMEWHAT DIFFICULT
1. LITTLE INTEREST OR PLEASURE IN DOING THINGS: NOT AT ALL
2. FEELING DOWN, DEPRESSED OR HOPELESS: SEVERAL DAYS
SUM OF ALL RESPONSES TO PHQ9 QUESTIONS 1 & 2: 1

## 2024-09-12 ASSESSMENT — PAIN SCALES - GENERAL: PAINLEVEL: 7

## 2024-09-12 NOTE — PROGRESS NOTES
[facility] [place]  Department of Neurological Surgery  Established Patient Visit    History of Present Illness  Kate Howard is a 74 y.o. year old female who presents to the spine clinic in follow up with prior history that is fairly complicated.  She underwent a L2-S1 extension revision hardware in 2020 and subsequently developed loosening of the hardware at L2 and subsequently underwent an extension revision of hardware L1 to pelvis.  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  The pain radiates through her back and all the way down into her hips.  She is walking with a cane.  She can only walk very short distances.  She states she has yet to stop smoking. She wishes to discuss surgical options today. Had an appointment in May where she had stopped smoking for 3-4 weeks and came for a follow up for testing. She had issues at the appointment and was denied testing she states and continued smoking after the appointment due to stress.     Previously tried water therapy which helped her.       Patient's BMI is Body mass index is 41.04 kg/m².    14/14 systems reviewed and negative other than what is listed in the history of present illness    Patient Active Problem List   Diagnosis    Postprocedural seroma of a musculoskeletal structure following a musculoskeletal system procedure    Pyriformis syndrome    Reflux esophagitis    S/P insertion of spinal cord stimulator    S/P lumbar fusion    Sacroiliitis (CMS-HCC)    SI (stress incontinence), female    Sleep apnea    Spinal cord stimulator dysfunction (CMS-HCC)    Tobacco dependency    Uterovaginal prolapse, incomplete    Vitamin B12 deficiency    Wound of right foot    Pneumothorax on left    Pneumonia    Peripheral neuropathy    Obesity    Morbid obesity due to excess  calories (Multi)    Lupus (Multi)    Lumbosacral spondylosis    Lumbosacral radiculitis    Lumbar spinal stenosis    Intracranial aneurysm (HHS-HCC)    Incisional irritation    ROXIE (iron deficiency anemia)    IBS (irritable bowel syndrome)    Hypothyroidism    HTN (hypertension)    Hiatal hernia    Gastric ulcer    Female cystocele    Factor V deficiency (Multi)    Factor 5 Leiden mutation, heterozygous (Multi)    Herniation of rectum into vagina    Edema of lower extremity    Type 2 diabetes mellitus (Multi)    Diabetes (Multi)    Elevated lipids    Chronic pain disorder    Cervicogenic headache    Cataract    Bilateral knee pain    Arthritis    Anemia    Acute UTI    Adverse effect of compound iron preparation     Past Medical History:   Diagnosis Date    Personal history of other diseases of the musculoskeletal system and connective tissue     History of arthritis    Personal history of other endocrine, nutritional and metabolic disease     History of diabetes mellitus     Past Surgical History:   Procedure Laterality Date    IR BODY DRAIN PLACEMENT  1/9/2024    IR BODY DRAIN PLACEMENT 1/9/2024 Anneliese Granados MD [facility]    OTHER SURGICAL HISTORY  02/07/2020    Cholecystectomy    OTHER SURGICAL HISTORY  02/07/2020    Back surgery    OTHER SURGICAL HISTORY  02/07/2020    Hernia repair    US GUIDED ABSCESS FLUID COLLECTION DRAINAGE  7/27/2022    US GUIDED ABSCESS FLUID COLLECTION DRAINAGE 7/27/2022 [facility]     Social History     Tobacco Use    Smoking status: Every Day     Types: Cigarettes    Smokeless tobacco: Never   Substance Use Topics    Alcohol use: Never     family history includes Cancer in an other family member; Hypertension in an other family member; cardiac disorder in an other family member.    Current Outpatient Medications:     acetaminophen (TylenoL) 325 mg capsule, 2 capsules (650 mg)., Disp: , Rfl:     albuterol 90 mcg/actuation inhaler, Inhale 2 puffs every 6 hours if needed for  wheezing., Disp: , Rfl:     amLODIPine (Norvasc) 5 mg tablet, Take 1 tablet (5 mg) by mouth once daily., Disp: , Rfl:     cyanocobalamin (Vitamin B-12) 1,000 mcg tablet, Take 1 tablet (1,000 mcg) by mouth once daily., Disp: , Rfl:     furosemide (Lasix) 20 mg tablet, 1 tablet (20 mg)., Disp: , Rfl:     gabapentin (Neurontin) 600 mg tablet, , Disp: , Rfl:     glimepiride (Amaryl) 2 mg tablet, Take 1 tablet (2 mg) by mouth once daily., Disp: , Rfl:     levothyroxine (Synthroid, Levoxyl) 125 mcg tablet, Take by mouth., Disp: , Rfl:     metFORMIN (Glucophage) 1,000 mg tablet, Take by mouth., Disp: , Rfl:     naloxone (Narcan) 4 mg/0.1 mL nasal spray, 1 sprays, Intranasal, ONCE, may repeat every 2 to 3 minutes until patient responds, 2 EA, Date: 3/27/24 1:34:00 PM EDT, [facility]/pharmacy #[id number], [place], 1 sprays Intranasal ONCE,Instr:may repeat every 2 to 3 minutes until patient responds, 155, 07/06/2023 13:28:00 EDT, Height/Length Dosing, cm, 105.1, 07/06/2023 13:51:00 EDT, Weight Dosing, kg, Disp: , Rfl:     omeprazole (PriLOSEC) 40 mg DR capsule, , Disp: , Rfl:     oxyCODONE (Roxicodone) 5 mg immediate release tablet, , Disp: , Rfl:     oxyCODONE-acetaminophen (Percocet)  mg tablet, , Disp: , Rfl:     topiramate (Topamax) 25 mg tablet, TAKE 1 TABLET BY MOUTH EVERY DAY AT BEDTIME THEN INCREASE TO 2 TABLETS AT BEDTIME, Disp: , Rfl:     triamterene-hydrochlorothiazid (Maxzide-25) 37.5-25 mg tablet, Take by mouth., Disp: , Rfl:   Allergies   Allergen Reactions    Pregabalin Swelling     swelling    Tramadol Other     mental status changes       Physical Examination:    General: Well developed, awake/alert/oriented x3, no distress, alert and cooperative  Skin: Warm and dry, no lesions, no rashes  ENMT: Mucous membranes moist, no apparent injury, no lesions seen  Head/Neck: Neck Supple, no apparent injury  Respiratory/Thorax: Normal breath sounds with good chest expansion, thorax symmetric  Cardiovascular: No pitting edema,  no JVD    Motor Strength: 5/5 Throughout all extremities    Muscle Bulk: Normal and symmetric in all extremities    Posture:   -- Cervical: Normal  -- Thoracic: Kyphosis  -- Lumbar : Kyphosis  Paraspinal muscle spasm/tenderness back pain.     Sensation: intact to light touch  Lumbar radiculopathy in the L2 and L3 distribution    Results:  I personally reviewed and interpreted the imaging results which included CT scan of the lumbar spine shows lucency around the screws at L1 with proximal junctional kyphosis and endplate deformity along the superior aspect of the L1 vertebral body and a large posterior fluid collection most likely consistent with a seroma    Assessment and Plan:      Kate Howard is a 74 y.o. year old female who presents to the spine clinic in follow up with proximal junctional kyphosis above an L1 to pelvis instrumented fusion.  I reviewed her scoliosis x-rays from July 2023 which show a pelvic incidence of approximately 55 degrees and a lumbar lordosis of before her prior surgery of approximately 25 degrees.  I had discussed originally doing a scoliosis deformity correction with her but we elected to perform just a extension revision of her hardware.  Unfortunately she has now developed proximal junctional kyphosis above the extension revision and she needs a scoliosis deformity correction which I counseled her upon today. I explained to her that surgery would be the last resort but it would involve a pedicle subtraction osteotomy at this time to restore her alignment and correct her lumbar lordosis.  I long discussion with her about the risk and benefits of the surgery.  Risks discussed with the patient today included failure to relieve neuropathy symptoms of the feet, nerve injury, recurrent disease, adjacent segment disease, heart attack, and stroke. We would have to take the hardware up to T10 and revise it all the way to the pelvis and then perform the correction maneuvers which she is  aware of.  Patient is currently still smoking which is inhibiting her from surgery. Discussed if she can quit again for 3-4 weeks we will schedule the nicotine test. We also discussed the need to stay nicotine free 6 weeks prior to surgery and 6 months after surgery. Following the testing we will schedule for surgery. Patient to continue her water pill.     I have reviewed imaging and diagnosis with the patient, discussed the natural history of their disease and both non-operative and operative treatments available and rationale vs risks for both.    The patient’s clinical symptoms correlates well with the radiological findings. Patient has been having significant functional impairment with decreased ability to perform her normal activities of daily living. They have tried treatment options including medications (NSAIDs/narcotics/muscle relaxants/membrane stabilizers), formal physical therapy, and injections.    I offered the option of surgery that would consist of a T8 to pelvis extension revision.    While there is no evidence of instability in the form of pars defect or spondylolisthesis or prior discectomies; because of the presence of foraminal and extraforaminal severe stenosis, extensive decompression with removal of almost the entire facet in the form of complete facetectomy /resection of pars interarticularis or more than 75% of the facet will have to be performed at the operative levels that will result in destabilization of the spine/intraoperative spinal instability and hence would require concomitant stabilization by placement of pedicle screws. I believe that not performing a fusion and instrumentation following the extensive decompression will be a suboptimal treatment and leave the spine destabilized with potential worsening of her symptoms and development of spinal deformity that may require a much bigger revision surgery that currently planned.    I have explained the surgical procedure in detail  with expected duration and extent of recovery along risks of surgery that include, but is not limited to bleeding, infection, blood vessel injury or damage, loss of sensation, loss of bladder, bowel or sexual function, nerve injury/damage resulting in weakness/paralysis, malunion, nonunion, CSF leak, brachial plexus injury, peripheral vision blindness, failure of implants/fusion, failure to relieve symptoms, recurrent disease, adjacent segment disease, need to reoperate for any reason and general anesthesia reaction such as stroke, coma, heart attack, delirium, confusion, death as well as worsening of preexisted medical conditions.     All questions were answered and the patient left satisfied with the surgical plan moving forward.      I have reviewed all prior documentation and reviewed the electronic medical record since admission. I have personally have reviewed all advanced imaging not just the reports and used my interpretation as documented as the relevant findings. I have reviewed the risks and benefits of all treatment recommendations listed in this note with the patient and family. I spent a total of 45 minutes in service to this patient's care during this date of service.      The above clinical summary has been dictated with voice recognition software. It has not been proofread for grammatical errors, typographical mistakes, or other semantic inconsistencies.    Thank you for visiting our office today. It was our pleasure to take part in your healthcare.     Do not hesitate to call with any questions regarding your plan of care after leaving at [phone] M-F 8am-4pm.     To clinicians, thank you very much for this kind referral. It is a privilege to partner with you in the care of your patients. My office would be delighted to assist you with any further consultations or with questions regarding the plan of care outlined. Do not hesitate to call the office or contact me directly.        Sincerely,      Doug Whitehead MD, [facility]  Spine , [facility]  Stevan Peñaloza and Danae Peñaloza Chair in Spinal Neurosurgery  Neurosurgery , [facility]  Complex Spine Surgery Fellowship Director   of Neurological Surgery  [employer] [facility]    [facility]  [address]  [address]  [place], [place] [address] [place] [address]  [address] [place] [address]  [address]  [place] [address]    Phone: [phone]  Fax: [phone]        Scribe Attestation  By signing my name below, I, Patrice Vaughn   attest that this documentation has been prepared under the direction and in the presence of Doug Whitehead MD.

## 2024-12-14 ENCOUNTER — APPOINTMENT (OUTPATIENT)
Dept: NEUROSURGERY | Facility: CLINIC | Age: 74
End: 2024-12-14
Payer: MEDICARE

## 2025-06-27 ENCOUNTER — APPOINTMENT (OUTPATIENT)
Dept: NEUROSURGERY | Facility: CLINIC | Age: 75
End: 2025-06-27
Payer: MEDICARE

## 2025-06-27 VITALS
HEART RATE: 78 BPM | TEMPERATURE: 97.5 F | HEIGHT: 62 IN | WEIGHT: 220 LBS | BODY MASS INDEX: 40.48 KG/M2 | SYSTOLIC BLOOD PRESSURE: 122 MMHG | DIASTOLIC BLOOD PRESSURE: 60 MMHG

## 2025-06-27 DIAGNOSIS — M41.35 THORACOGENIC SCOLIOSIS OF THORACOLUMBAR REGION: ICD-10-CM

## 2025-06-27 DIAGNOSIS — Z72.0 SMOKING TRYING TO QUIT: Primary | ICD-10-CM

## 2025-06-27 PROCEDURE — 1157F ADVNC CARE PLAN IN RCRD: CPT | Performed by: NURSE PRACTITIONER

## 2025-06-27 PROCEDURE — 1125F AMNT PAIN NOTED PAIN PRSNT: CPT | Performed by: NURSE PRACTITIONER

## 2025-06-27 PROCEDURE — 99214 OFFICE O/P EST MOD 30 MIN: CPT | Performed by: NURSE PRACTITIONER

## 2025-06-27 PROCEDURE — 3078F DIAST BP <80 MM HG: CPT | Performed by: NURSE PRACTITIONER

## 2025-06-27 PROCEDURE — 3008F BODY MASS INDEX DOCD: CPT | Performed by: NURSE PRACTITIONER

## 2025-06-27 PROCEDURE — 1159F MED LIST DOCD IN RCRD: CPT | Performed by: NURSE PRACTITIONER

## 2025-06-27 PROCEDURE — 3074F SYST BP LT 130 MM HG: CPT | Performed by: NURSE PRACTITIONER

## 2025-06-27 PROCEDURE — 99406 BEHAV CHNG SMOKING 3-10 MIN: CPT | Performed by: NURSE PRACTITIONER

## 2025-06-27 ASSESSMENT — PAIN SCALES - GENERAL: PAINLEVEL_OUTOF10: 8

## 2025-06-27 ASSESSMENT — PATIENT HEALTH QUESTIONNAIRE - PHQ9
2. FEELING DOWN, DEPRESSED OR HOPELESS: NOT AT ALL
1. LITTLE INTEREST OR PLEASURE IN DOING THINGS: NOT AT ALL
SUM OF ALL RESPONSES TO PHQ9 QUESTIONS 1 AND 2: 0

## 2025-06-30 NOTE — PROGRESS NOTES
[facility] [place]  Department of Neurological Surgery  Established Patient Visit    History of Present Illness:     Kate Howard is a 75 y.o. year old female who presents to the spine clinic with back pain.  Patient has a history of an L2-S1 extension and revision of hardware in 2020 but subsequently developed loosening of the hardware at L2 and subsequently underwent an extension revision of the hardware L1 to pelvis on 08/02/2023 with Dr. Doug Whitehead.  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  Unfortunately she has only been able to achieve a few weeks with her smoking cessation at a time and then resumes which prohibits her from proceeding with elective surgery.  Since her last appointment with Dr. Whitehead her pain has continued to worsen and she continues to have impaired mobility due to pain.  She comes in today discuss possibility of surgical interventions and smoking cessation.    Chief Complaint   Patient presents with    Follow-up     Patient is complaining of low back pain radiating down the right leg and neck pain.  The pain is described as aching, throbbing, and stabbing.      14/14 systems reviewed and negative other than what is listed in the history of present illness  Problem List[1]  Medical History[2]  Surgical History[3]  Social History     Tobacco Use    Smoking status: Every Day     Types: Cigarettes    Smokeless tobacco: Never   Substance Use Topics    Alcohol use: Never     family history includes Cancer in an other family member; Hypertension in an other family member; cardiac disorder in an other family member.  Current Medications[4]  Allergies[5]    Physical Examination:     General: Well developed, awake/alert/oriented x3, no distress, alert and cooperative  Skin: Warm and dry, no  lesions, no rashes  ENMT: Mucous membranes moist, no apparent injury, no lesions seen  Head/Neck: Neck Supple, no apparent injury  Respiratory/Thorax: Normal breath sounds with good chest expansion, thorax symmetric  Cardiovascular: No pitting edema, no JVD    Motor Strength: 5/5 Throughout bilateral upper extremities and bilateral lower extremities    Muscle Bulk: Normal and symmetric in all extremities     Paraspinal muscle spasm/tenderness present    Midline tenderness present    Sensation to light touch intact    Results:     No new imaging this visit    Assessment and Plan:     Kate Howard is a 75 y.o. year old female who presents to the spine clinic with back pain.  Patient has a history of an L2-S1 extension and revision of hardware in 2020 but subsequently developed loosening of the hardware at L2 and subsequently underwent an extension revision of the hardware L1 to pelvis on 08/02/2023 with Dr. Doug Whitehead.  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  Unfortunately she has only been able to achieve a few weeks with her smoking cessation at a time and then resumes which prohibits her from proceeding with elective surgery.  Since her last appointment with Dr. Whitehead her pain has continued to worsen and she continues to have impaired mobility due to pain.  She comes in today discuss possibility of surgical interventions and smoking cessation.    On physical exam the patient is alert and oriented x 3.  No focal deficits on neurologic exam.  She has 5/5 strength in all 4 extremities.  Midline pain from L2-S1 radiating into the bilateral paraspinal musculature and lower extremities (R >L).  She is ambulating with the assistance of a cane and has +3 pitting edema in bilateral lower extremities without weeping, this is not  new and per the patient she is at her baseline.  She also was noted to have a seroma along her lumbar spinal incision at the midline that when pressed does cause a wave like motion under her skin.  Patient has had lumbar drain placed with IR on 01/09/2024.  This was noted to be due to chronic spinal fluid leak.  The drain was removed on 02/16/2024 and unfortunately the fluid collection is reaccumulated.  She denies any loss of bowel or bladder control or saddle anesthesia.    Per the last office note with Dr. Whitehead patient would need to be nicotine free for 6 weeks prior to surgery and 6 months after surgery.  If she can achieve her smoking cessation goal he plans on doing a scoliosis deformity surgery.  I discussed this with the patient and she is eager to get scheduled for surgery.  We discussed smoking cessation in great detail as well as modalities to assist her in her cessation goal.  She has a granddaughter who she is eager to visit with and plans to use the money she would spend on tobacco products to be used specifically for a trip to see her granddaughter.  She also plans to call the quit smoking cessation helpline.  We discussed finding a substitute for her oral fixation and stress relief.  Current plan is for the patient to quit completely over the next week and be nicotine free for 4 weeks and obtain a serum nicotine level prior to seeing me in the office on 01/08/2025.  If that serum nicotine level is negative I will have her scheduled into see Dr. Whitehead.  I discussed with the patient she would need to supply us with a another serum nicotine prior to surgery to prove that she remained free of smoking in the preoperative period.  We also discussed that I can be used as a resource for her after surgery to remain smoke-free for at least 6 months.  The patient verbalized understanding and will work towards her goal of smoking cessation.  She also will work towards obtaining some meaningful weight loss since  her BMI is at the upper level of acceptable for elective surgery.  I reviewed red flag symptoms with the patient as well as how when to seek emergency medical care.  She will contact the office in the meantime with any questions or concerns.      Samantha Meeson, MSN, NP-C  Adult-Gerontology Associate Nurse Practitioner  Department of Neurosurgery- [facility] [place]  Main phone [phone]  Fax [phone]               [1]   Patient Active Problem List  Diagnosis    Postprocedural seroma of a musculoskeletal structure following a musculoskeletal system procedure    Pyriformis syndrome    Reflux esophagitis    S/P insertion of spinal cord stimulator    S/P lumbar fusion    Sacroiliitis    SI (stress incontinence), female    Sleep apnea    Spinal cord stimulator dysfunction    Tobacco dependency    Uterovaginal prolapse, incomplete    Vitamin B12 deficiency    Wound of right foot    Pneumothorax on left    Pneumonia    Peripheral neuropathy    Obesity    Morbid obesity due to excess calories (Multi)    Lupus    Lumbosacral spondylosis    Lumbosacral radiculitis    Lumbar spinal stenosis    Intracranial aneurysm (HHS-HCC)    Incisional irritation    ROXIE (iron deficiency anemia)    IBS (irritable bowel syndrome)    Hypothyroidism    HTN (hypertension)    Hiatal hernia    Gastric ulcer    Female cystocele    Factor V deficiency (Multi)    Factor 5 Leiden mutation, heterozygous (Multi)    Herniation of rectum into vagina    Edema of lower extremity    Type 2 diabetes mellitus    Diabetes (Multi)    Elevated lipids    Chronic pain disorder    Cervicogenic headache    Cataract    Bilateral knee pain    Arthritis    Anemia    Acute UTI    Adverse effect of compound iron preparation   [2]   Past Medical History:  Diagnosis Date    Personal history of other diseases of the musculoskeletal system and connective tissue     History of arthritis    Personal history of other endocrine, nutritional and metabolic disease     History  of diabetes mellitus   [3]   Past Surgical History:  Procedure Laterality Date    IR BODY DRAIN PLACEMENT  1/9/2024    IR BODY DRAIN PLACEMENT 1/9/2024 Anneliese Granados MD [facility]    OTHER SURGICAL HISTORY  02/07/2020    Cholecystectomy    OTHER SURGICAL HISTORY  02/07/2020    Back surgery    OTHER SURGICAL HISTORY  02/07/2020    Hernia repair    US GUIDED ABSCESS FLUID COLLECTION DRAINAGE  7/27/2022    US GUIDED ABSCESS FLUID COLLECTION DRAINAGE 7/27/2022 [facility]   [4]   Current Outpatient Medications:     acetaminophen (TylenoL) 325 mg capsule, 2 capsules (650 mg)., Disp: , Rfl:     amLODIPine (Norvasc) 5 mg tablet, Take 1 tablet (5 mg) by mouth once daily., Disp: , Rfl:     furosemide (Lasix) 20 mg tablet, 1 tablet (20 mg)., Disp: , Rfl:     gabapentin (Neurontin) 600 mg tablet, , Disp: , Rfl:     glimepiride (Amaryl) 2 mg tablet, Take 1 tablet (2 mg) by mouth once daily., Disp: , Rfl:     levothyroxine (Synthroid, Levoxyl) 125 mcg tablet, Take by mouth., Disp: , Rfl:     metFORMIN (Glucophage) 1,000 mg tablet, Take by mouth., Disp: , Rfl:     naloxone (Narcan) 4 mg/0.1 mL nasal spray, 1 sprays, Intranasal, ONCE, may repeat every 2 to 3 minutes until patient responds, 2 EA, Date: 3/27/24 1:34:00 PM EDT, [facility]/pharmacy #[id number], [place], 1 sprays Intranasal ONCE,Instr:may repeat every 2 to 3 minutes until patient responds, 155, 07/06/2023 13:28:00 EDT, Height/Length Dosing, cm, 105.1, 07/06/2023 13:51:00 EDT, Weight Dosing, kg, Disp: , Rfl:     omeprazole (PriLOSEC) 40 mg DR capsule, , Disp: , Rfl:     oxyCODONE (Roxicodone) 5 mg immediate release tablet, , Disp: , Rfl:     oxyCODONE-acetaminophen (Percocet)  mg tablet, , Disp: , Rfl:     triamterene-hydrochlorothiazid (Maxzide-25) 37.5-25 mg tablet, Take by mouth., Disp: , Rfl:     albuterol 90 mcg/actuation inhaler, Inhale 2 puffs every 6 hours if needed for wheezing. (Patient not taking: Reported on 6/27/2025), Disp: , Rfl:     cyanocobalamin  (Vitamin B-12) 1,000 mcg tablet, Take 1 tablet (1,000 mcg) by mouth once daily. (Patient not taking: Reported on 6/27/2025), Disp: , Rfl:     topiramate (Topamax) 25 mg tablet, TAKE 1 TABLET BY MOUTH EVERY DAY AT BEDTIME THEN INCREASE TO 2 TABLETS AT BEDTIME (Patient not taking: Reported on 6/27/2025), Disp: , Rfl:   [5]   Allergies  Allergen Reactions    Pregabalin Swelling     swelling    Tramadol Other     mental status changes

## 2025-08-01 ENCOUNTER — APPOINTMENT (OUTPATIENT)
Dept: NEUROSURGERY | Facility: CLINIC | Age: 75
End: 2025-08-01
Payer: MEDICARE

## 2025-08-26 ENCOUNTER — APPOINTMENT (OUTPATIENT)
Dept: NEUROSURGERY | Facility: CLINIC | Age: 75
End: 2025-08-26
Payer: MEDICARE

## 2025-08-26 VITALS
HEIGHT: 62 IN | SYSTOLIC BLOOD PRESSURE: 158 MMHG | WEIGHT: 202.4 LBS | BODY MASS INDEX: 37.25 KG/M2 | HEART RATE: 82 BPM | TEMPERATURE: 97.3 F | DIASTOLIC BLOOD PRESSURE: 80 MMHG

## 2025-08-26 DIAGNOSIS — Z98.1 S/P LUMBAR FUSION: ICD-10-CM

## 2025-08-26 DIAGNOSIS — M96.842 POSTOPERATIVE SEROMA OF MUSCULOSKELETAL STRUCTURE AFTER MUSCULOSKELETAL PROCEDURE: ICD-10-CM

## 2025-08-26 DIAGNOSIS — M41.9 SCOLIOSIS OF THORACIC SPINE, UNSPECIFIED SCOLIOSIS TYPE: ICD-10-CM

## 2025-08-26 DIAGNOSIS — M41.35 THORACOGENIC SCOLIOSIS OF THORACOLUMBAR REGION: ICD-10-CM

## 2025-08-26 DIAGNOSIS — M48.062 SPINAL STENOSIS OF LUMBAR REGION WITH NEUROGENIC CLAUDICATION: ICD-10-CM

## 2025-08-26 DIAGNOSIS — M81.0 AGE RELATED OSTEOPOROSIS, UNSPECIFIED PATHOLOGICAL FRACTURE PRESENCE: ICD-10-CM

## 2025-08-26 DIAGNOSIS — Z72.0 SMOKING TRYING TO QUIT: Primary | ICD-10-CM

## 2025-08-26 PROCEDURE — 3008F BODY MASS INDEX DOCD: CPT | Performed by: NURSE PRACTITIONER

## 2025-08-26 PROCEDURE — 3079F DIAST BP 80-89 MM HG: CPT | Performed by: NURSE PRACTITIONER

## 2025-08-26 PROCEDURE — 3077F SYST BP >= 140 MM HG: CPT | Performed by: NURSE PRACTITIONER

## 2025-08-26 PROCEDURE — 1159F MED LIST DOCD IN RCRD: CPT | Performed by: NURSE PRACTITIONER

## 2025-08-26 PROCEDURE — 99406 BEHAV CHNG SMOKING 3-10 MIN: CPT | Performed by: NURSE PRACTITIONER

## 2025-08-26 PROCEDURE — 99215 OFFICE O/P EST HI 40 MIN: CPT | Performed by: NURSE PRACTITIONER

## 2025-08-26 PROCEDURE — 1125F AMNT PAIN NOTED PAIN PRSNT: CPT | Performed by: NURSE PRACTITIONER

## 2025-08-26 ASSESSMENT — PATIENT HEALTH QUESTIONNAIRE - PHQ9
1. LITTLE INTEREST OR PLEASURE IN DOING THINGS: NOT AT ALL
SUM OF ALL RESPONSES TO PHQ9 QUESTIONS 1 AND 2: 0
2. FEELING DOWN, DEPRESSED OR HOPELESS: NOT AT ALL

## 2025-08-26 ASSESSMENT — PAIN SCALES - GENERAL: PAINLEVEL_OUTOF10: 8

## 2025-10-28 ENCOUNTER — APPOINTMENT (OUTPATIENT)
Dept: NEUROSURGERY | Facility: CLINIC | Age: 75
End: 2025-10-28
Payer: MEDICARE